# Patient Record
Sex: FEMALE | Race: WHITE | NOT HISPANIC OR LATINO | Employment: UNEMPLOYED | URBAN - NONMETROPOLITAN AREA
[De-identification: names, ages, dates, MRNs, and addresses within clinical notes are randomized per-mention and may not be internally consistent; named-entity substitution may affect disease eponyms.]

---

## 2019-07-10 PROBLEM — K59.00 CONSTIPATION: Status: ACTIVE | Noted: 2019-07-10

## 2019-07-10 PROBLEM — R10.13 EPIGASTRIC PAIN: Status: ACTIVE | Noted: 2019-07-10

## 2020-07-03 ENCOUNTER — HISTORICAL (OUTPATIENT)
Dept: ADMINISTRATIVE | Facility: HOSPITAL | Age: 35
End: 2020-07-03

## 2020-07-03 LAB
ALBUMIN SERPL BCP-MCNC: 3.1 G/DL (ref 3.5–5)
ALBUMIN/GLOB SERPL ELPH: 0.8 {RATIO} (ref 1.5–2.2)
ALP SERPL-CCNC: 70 U/L (ref 45–117)
ALT SERPL W P-5'-P-CCNC: 39 U/L (ref 13–56)
ANION GAP SERPL CALC-SCNC: 7.6 MEQ/L (ref 10–20)
APPEARANCE, UA: NORMAL
AST SERPL-CCNC: 35 U/L (ref 15–37)
B-HCG UR QL: NEGATIVE
BACTERIA SPEC CULT: NORMAL /HPF
BASOPHILS NFR BLD: 0.1 10 (ref 0–0.1)
BASOPHILS NFR BLD: 0.3 % (ref 0–1.5)
BILIRUB SERPL-MCNC: 0.78 MG/DL (ref 0.2–1)
BILIRUB UR QL STRIP: NORMAL MG/DL
BUDDING YEAST: NORMAL /HPF
BUN SERPL-MCNC: 14 MG/DL (ref 7–18)
CALCIUM SERPL-MCNC: 8.7 MG/DL (ref 8.5–10.1)
CASTS, URINE MICROSCOPIC: NEGATIVE /LPF
CHLORIDE SERPL-SCNC: 105 MMOL/L (ref 98–107)
CO2 SERPL-SCNC: 28 MMOL/L (ref 22–32)
COLOR UR: NORMAL
CREAT SERPL-MCNC: 0.75 MG/DL (ref 0.55–1.02)
EGFR: 95 ML/MIN/1.73M
EOSINOPHIL NFR BLD: 0.1 10 (ref 0–0.7)
EOSINOPHIL NFR BLD: 0.4 % (ref 0–7)
EPITHELIAL, URINE MICROSCOPIC: 50 /HPF
ERYTHROCYTE [DISTWIDTH] IN BLOOD BY AUTOMATED COUNT: 12.8 % (ref 11.5–14.5)
GLOBULIN: 3.9 G/DL (ref 2.3–3.5)
GLUCOSE (UA): NEGATIVE MG/DL
GLUCOSE SERPL-MCNC: 97 MG/DL (ref 70–99)
GRAN #: 11.56 10 (ref 2–7.5)
GRAN%: 0.5 %
GRAN%: 75.4 % (ref 50–80)
HCT VFR BLD AUTO: 47.6 % (ref 37.7–47.9)
HGB BLD-MCNC: 15.9 G/DL (ref 12.2–16.2)
HGB UR QL STRIP: NORMAL ERY/UL
IMMATURE GRANULOCYTES #: 0.08 10
INTERNAL NEG CONTROL: NEGATIVE
INTERNAL POS CONTROL: POSITIVE
KETONES UR QL STRIP: 15 MG/DL
LEUKOCYTE ESTERASE UR QL STRIP: NORMAL LEU/UL
LIPASE SERPL-CCNC: 64 U/L (ref 73–393)
LYMPH #: 2.9 10 (ref 1–3.5)
LYMPH%: 18.7 % (ref 12–50)
MCH RBC QN AUTO: 30.1 PG (ref 27–31)
MCHC RBC AUTO-ENTMCNC: 33.4 G% (ref 32–35)
MCV RBC AUTO: 90 FL (ref 80–97)
MONO #: 0.7 10 (ref 0–0.8)
MONO%: 4.7 % (ref 0–12)
NITRITE UR QL STRIP: NEGATIVE MG/DL
OSMOC: 274 MOSM/KG (ref 275–295)
PH UR STRIP: 6 [PH] (ref 5–7.5)
PMV BLD AUTO: 427 10 (ref 142–424)
PMV BLD AUTO: 8.9 FL (ref 7.4–10.4)
POTASSIUM SERPL-SCNC: 3.6 MMOL/L (ref 3.5–5.1)
PROT SERPL-MCNC: 7 G/DL (ref 6.4–8.2)
PROT UR QL STRIP: NORMAL MG/DL
RBC # BLD AUTO: 5.29 M/UL (ref 4.04–5.48)
RBC #/AREA URNS HPF: 12 /HPF
SODIUM BLD-SCNC: 137 MMOL/L (ref 136–145)
SP GR UR STRIP: 1.03 (ref 1–1.03)
SPERM, URINE MICROSCOPIC: NORMAL /HPF
TYPE OF SPECIMEN  (UA): NORMAL
UNCLASSIFIED CRYSTALS, UA: NORMAL /HPF
UROBILINOGEN UR STRIP-ACNC: 1 EU/L
WBC # BLD AUTO: 15.3 10 (ref 4–10.2)
WBC #/AREA URNS HPF: 65 /HPF

## 2020-09-04 ENCOUNTER — HOSPITAL ENCOUNTER (EMERGENCY)
Facility: HOSPITAL | Age: 35
Discharge: HOME | End: 2020-09-04
Attending: EMERGENCY MEDICINE
Payer: MEDICAID

## 2020-09-04 VITALS
OXYGEN SATURATION: 100 % | DIASTOLIC BLOOD PRESSURE: 80 MMHG | WEIGHT: 160 LBS | RESPIRATION RATE: 18 BRPM | HEIGHT: 67 IN | HEART RATE: 88 BPM | TEMPERATURE: 98 F | BODY MASS INDEX: 25.11 KG/M2 | SYSTOLIC BLOOD PRESSURE: 136 MMHG

## 2020-09-04 DIAGNOSIS — K58.9 IRRITABLE BOWEL SYNDROME, UNSPECIFIED TYPE: Primary | ICD-10-CM

## 2020-09-04 PROCEDURE — 96361 HYDRATE IV INFUSION ADD-ON: CPT

## 2020-09-04 PROCEDURE — 96374 THER/PROPH/DIAG INJ IV PUSH: CPT

## 2020-09-04 PROCEDURE — 99284 EMERGENCY DEPT VISIT MOD MDM: CPT | Mod: 25

## 2020-09-04 PROCEDURE — 63600175 PHARM REV CODE 636 W HCPCS: Performed by: CLINICAL NURSE SPECIALIST

## 2020-09-04 PROCEDURE — 25000003 PHARM REV CODE 250: Performed by: CLINICAL NURSE SPECIALIST

## 2020-09-04 RX ORDER — LISINOPRIL 40 MG/1
40 TABLET ORAL DAILY
COMMUNITY
End: 2022-08-29 | Stop reason: SDUPTHER

## 2020-09-04 RX ORDER — LEVOTHYROXINE SODIUM 75 UG/1
75 TABLET ORAL
COMMUNITY

## 2020-09-04 RX ORDER — PANTOPRAZOLE SODIUM 40 MG/1
40 FOR SUSPENSION ORAL DAILY
COMMUNITY

## 2020-09-04 RX ORDER — DICYCLOMINE HYDROCHLORIDE 10 MG/1
CAPSULE ORAL
Qty: 20 CAPSULE | Refills: 0 | Status: SHIPPED | OUTPATIENT
Start: 2020-09-04 | End: 2022-09-13

## 2020-09-04 RX ORDER — ONDANSETRON 2 MG/ML
4 INJECTION INTRAMUSCULAR; INTRAVENOUS
Status: COMPLETED | OUTPATIENT
Start: 2020-09-04 | End: 2020-09-04

## 2020-09-04 RX ADMIN — ONDANSETRON 4 MG: 2 INJECTION INTRAMUSCULAR; INTRAVENOUS at 12:09

## 2020-09-04 RX ADMIN — SODIUM CHLORIDE 1000 ML: 0.9 INJECTION, SOLUTION INTRAVENOUS at 12:09

## 2020-09-04 RX ADMIN — LIDOCAINE HYDROCHLORIDE: 20 SOLUTION ORAL; TOPICAL at 12:09

## 2020-09-04 NOTE — ED PROVIDER NOTES
Encounter Date: 9/4/2020       History     Chief Complaint   Patient presents with    Abdominal Pain     hx of IBS and ate speg last night which is now causing severe pain and vomiting     34-YEAR-OLD FEMALE PRESENTS ER WITH ABDOMINAL CRAMPING WITH NAUSEA AND VOMITING SINCE LAST NIGHT.  PATIENT HAS A HISTORY OF IBS AND ATE SPAGHETTI FOR SUPPER.  PATIENT IS OUT OF HER BENTYL  CURRENTLY        Review of patient's allergies indicates:   Allergen Reactions    Pcn [penicillins] Hives     Past Medical History:   Diagnosis Date    Chronic constipation     GERD (gastroesophageal reflux disease)     Hypertension     IBS (irritable bowel syndrome)     Thyroid disease      Past Surgical History:   Procedure Laterality Date    BTL       History reviewed. No pertinent family history.  Social History     Tobacco Use    Smoking status: Current Every Day Smoker     Packs/day: 1.00     Types: Cigarettes    Smokeless tobacco: Never Used   Substance Use Topics    Alcohol use: Never     Frequency: Never    Drug use: Not Currently     Comment: Hx opiates-quit 2015     Review of Systems   Constitutional: Positive for appetite change. Negative for fever.   HENT: Negative for sore throat.    Respiratory: Negative for shortness of breath.    Cardiovascular: Negative for chest pain.   Gastrointestinal: Positive for abdominal pain, nausea and vomiting.   Genitourinary: Negative for dysuria.   Musculoskeletal: Negative for back pain.   Skin: Negative for rash.   Neurological: Negative for weakness.   Hematological: Does not bruise/bleed easily.       Physical Exam     Initial Vitals [09/04/20 1129]   BP Pulse Resp Temp SpO2   (!) 158/79 110 20 97.9 °F (36.6 °C) 100 %      MAP       --         Physical Exam    Nursing note and vitals reviewed.  Constitutional: She appears well-developed and well-nourished.   HENT:   Head: Normocephalic and atraumatic.   Eyes: Pupils are equal, round, and reactive to light.   Neck: Normal range of  motion.   Cardiovascular: Normal rate and regular rhythm.   Pulmonary/Chest: Breath sounds normal.   Abdominal: Soft. Bowel sounds are normal. There is abdominal tenderness.   Musculoskeletal: Normal range of motion.   Neurological: She is alert and oriented to person, place, and time.   Skin: Skin is warm and dry.   Psychiatric: She has a normal mood and affect.         ED Course   Procedures  Labs Reviewed - No data to display       Imaging Results    None          Medical Decision Making:   Differential Diagnosis:   GERD, IBS                                 Clinical Impression:       ICD-10-CM ICD-9-CM   1. Irritable bowel syndrome, unspecified type  K58.9 564.1             ED Disposition Condition    Discharge Stable        ED Prescriptions     Medication Sig Dispense Start Date End Date Auth. Provider    dicyclomine (BENTYL) 10 MG capsule TAKE TWO CAPSULES BY MOUTH EVERY 6 HOURS AS NEEDED FOR abdominal cramps THANK YOU 20 capsule 9/4/2020  Julia Vanegas NP        Follow-up Information    None                                    Julia Vanegas NP  09/04/20 3883

## 2020-12-20 ENCOUNTER — HOSPITAL ENCOUNTER (INPATIENT)
Facility: HOSPITAL | Age: 35
LOS: 5 days | Discharge: HOME | DRG: 373 | End: 2020-12-25
Attending: EMERGENCY MEDICINE | Admitting: INTERNAL MEDICINE
Payer: MEDICAID

## 2020-12-20 DIAGNOSIS — E86.9 VOLUME DEPLETION: ICD-10-CM

## 2020-12-20 DIAGNOSIS — R11.2 NAUSEA AND VOMITING, INTRACTABILITY OF VOMITING NOT SPECIFIED, UNSPECIFIED VOMITING TYPE: Primary | ICD-10-CM

## 2020-12-20 DIAGNOSIS — F15.10 AMPHETAMINE ABUSE: ICD-10-CM

## 2020-12-20 DIAGNOSIS — K52.9 ENTERITIS: ICD-10-CM

## 2020-12-20 LAB
ALBUMIN SERPL BCP-MCNC: 3.7 G/DL (ref 3.5–5.2)
ALP SERPL-CCNC: 72 U/L (ref 55–135)
ALT SERPL W/O P-5'-P-CCNC: 38 U/L (ref 10–44)
AMPHET+METHAMPHET UR QL: NORMAL
ANION GAP SERPL CALC-SCNC: 5 MMOL/L (ref 8–16)
AST SERPL-CCNC: 30 U/L (ref 10–40)
BACTERIA #/AREA URNS HPF: ABNORMAL /HPF
BARBITURATES UR QL SCN>200 NG/ML: NEGATIVE
BASOPHILS # BLD AUTO: 0.05 K/UL (ref 0–0.2)
BASOPHILS NFR BLD: 0.3 % (ref 0–1.9)
BENZODIAZ UR QL SCN>200 NG/ML: NEGATIVE
BILIRUB SERPL-MCNC: 0.9 MG/DL (ref 0.1–1)
BILIRUB UR QL STRIP: NEGATIVE
BUN SERPL-MCNC: 22 MG/DL (ref 6–20)
BZE UR QL SCN: NEGATIVE
CALCIUM SERPL-MCNC: 9 MG/DL (ref 8.7–10.5)
CANNABINOIDS UR QL SCN: NORMAL
CHLORIDE SERPL-SCNC: 104 MMOL/L (ref 95–110)
CLARITY UR: ABNORMAL
CO2 SERPL-SCNC: 31 MMOL/L (ref 23–29)
COLOR UR: YELLOW
CREAT SERPL-MCNC: 1 MG/DL (ref 0.5–1.4)
CREAT UR-MCNC: 325 MG/DL (ref 15–325)
DIFFERENTIAL METHOD: ABNORMAL
EOSINOPHIL # BLD AUTO: 0.1 K/UL (ref 0–0.5)
EOSINOPHIL NFR BLD: 0.3 % (ref 0–8)
ERYTHROCYTE [DISTWIDTH] IN BLOOD BY AUTOMATED COUNT: 12.1 % (ref 11.5–14.5)
EST. GFR  (AFRICAN AMERICAN): >60 ML/MIN/1.73 M^2
EST. GFR  (NON AFRICAN AMERICAN): >60 ML/MIN/1.73 M^2
ETHANOL SERPL-MCNC: <3 MG/DL
GLUCOSE SERPL-MCNC: 106 MG/DL (ref 70–110)
GLUCOSE UR QL STRIP: NEGATIVE
HCT VFR BLD AUTO: 42.9 % (ref 37–48.5)
HGB BLD-MCNC: 14.2 G/DL (ref 12–16)
HGB UR QL STRIP: NEGATIVE
HYALINE CASTS #/AREA URNS LPF: 0 /LPF
IMM GRANULOCYTES # BLD AUTO: 0.08 K/UL (ref 0–0.04)
IMM GRANULOCYTES NFR BLD AUTO: 0.5 % (ref 0–0.5)
KETONES UR QL STRIP: >=160
LEUKOCYTE ESTERASE UR QL STRIP: ABNORMAL
LIPASE SERPL-CCNC: 71 U/L (ref 23–300)
LYMPHOCYTES # BLD AUTO: 2.9 K/UL (ref 1–4.8)
LYMPHOCYTES NFR BLD: 16.9 % (ref 18–48)
MCH RBC QN AUTO: 28.7 PG (ref 27–31)
MCHC RBC AUTO-ENTMCNC: 33.1 G/DL (ref 32–36)
MCV RBC AUTO: 87 FL (ref 82–98)
METHADONE UR QL SCN>300 NG/ML: NEGATIVE
MICROSCOPIC COMMENT: ABNORMAL
MONOCYTES # BLD AUTO: 0.8 K/UL (ref 0.3–1)
MONOCYTES NFR BLD: 4.7 % (ref 4–15)
NEUTROPHILS # BLD AUTO: 13.2 K/UL (ref 1.8–7.7)
NEUTROPHILS NFR BLD: 77.3 % (ref 38–73)
NITRITE UR QL STRIP: NEGATIVE
NRBC BLD-RTO: 0 /100 WBC
OPIATES UR QL SCN: NEGATIVE
PCP UR QL SCN>25 NG/ML: NEGATIVE
PH UR STRIP: 8 [PH] (ref 5–8)
PLATELET # BLD AUTO: 435 K/UL (ref 150–350)
PMV BLD AUTO: 9.4 FL (ref 9.2–12.9)
POTASSIUM SERPL-SCNC: 3.4 MMOL/L (ref 3.5–5.1)
PROT SERPL-MCNC: 8.1 G/DL (ref 6–8.4)
PROT UR QL STRIP: ABNORMAL
RBC # BLD AUTO: 4.95 M/UL (ref 4–5.4)
RBC #/AREA URNS HPF: 11 /HPF (ref 0–4)
SODIUM SERPL-SCNC: 140 MMOL/L (ref 136–145)
SP GR UR STRIP: >=1.03 (ref 1–1.03)
SQUAMOUS #/AREA URNS HPF: 4 /HPF
TOXICOLOGY INFORMATION: NORMAL
URN SPEC COLLECT METH UR: ABNORMAL
UROBILINOGEN UR STRIP-ACNC: 1 EU/DL
WBC # BLD AUTO: 17.09 K/UL (ref 3.9–12.7)
WBC #/AREA URNS HPF: 62 /HPF (ref 0–5)

## 2020-12-20 PROCEDURE — 36415 COLL VENOUS BLD VENIPUNCTURE: CPT

## 2020-12-20 PROCEDURE — 81025 URINE PREGNANCY TEST: CPT

## 2020-12-20 PROCEDURE — 81000 URINALYSIS NONAUTO W/SCOPE: CPT | Mod: 59

## 2020-12-20 PROCEDURE — 80307 DRUG TEST PRSMV CHEM ANLYZR: CPT

## 2020-12-20 PROCEDURE — 63600175 PHARM REV CODE 636 W HCPCS: Performed by: EMERGENCY MEDICINE

## 2020-12-20 PROCEDURE — 83690 ASSAY OF LIPASE: CPT

## 2020-12-20 PROCEDURE — 85025 COMPLETE CBC W/AUTO DIFF WBC: CPT

## 2020-12-20 PROCEDURE — 96375 TX/PRO/DX INJ NEW DRUG ADDON: CPT

## 2020-12-20 PROCEDURE — 11000001 HC ACUTE MED/SURG PRIVATE ROOM

## 2020-12-20 PROCEDURE — 99285 EMERGENCY DEPT VISIT HI MDM: CPT | Mod: 25

## 2020-12-20 PROCEDURE — 80320 DRUG SCREEN QUANTALCOHOLS: CPT

## 2020-12-20 PROCEDURE — 80053 COMPREHEN METABOLIC PANEL: CPT

## 2020-12-20 PROCEDURE — 96365 THER/PROPH/DIAG IV INF INIT: CPT

## 2020-12-20 PROCEDURE — 87086 URINE CULTURE/COLONY COUNT: CPT

## 2020-12-20 PROCEDURE — 25000003 PHARM REV CODE 250: Performed by: EMERGENCY MEDICINE

## 2020-12-20 RX ORDER — HYDROMORPHONE HYDROCHLORIDE 1 MG/ML
1 INJECTION, SOLUTION INTRAMUSCULAR; INTRAVENOUS; SUBCUTANEOUS
Status: COMPLETED | OUTPATIENT
Start: 2020-12-20 | End: 2020-12-20

## 2020-12-20 RX ADMIN — SODIUM CHLORIDE 1000 ML: 0.9 INJECTION, SOLUTION INTRAVENOUS at 11:12

## 2020-12-20 RX ADMIN — SODIUM CHLORIDE 200 ML: 0.9 INJECTION, SOLUTION INTRAVENOUS at 10:12

## 2020-12-20 RX ADMIN — HYDROMORPHONE HYDROCHLORIDE 1 MG: 1 INJECTION, SOLUTION INTRAMUSCULAR; INTRAVENOUS; SUBCUTANEOUS at 10:12

## 2020-12-20 RX ADMIN — SODIUM CHLORIDE 1000 ML: 0.9 INJECTION, SOLUTION INTRAVENOUS at 10:12

## 2020-12-20 RX ADMIN — PROMETHAZINE HYDROCHLORIDE 12.5 MG: 25 INJECTION INTRAMUSCULAR; INTRAVENOUS at 10:12

## 2020-12-21 PROBLEM — K52.9 ENTERITIS: Status: ACTIVE | Noted: 2020-12-21

## 2020-12-21 PROBLEM — F19.10 POLYSUBSTANCE ABUSE: Status: ACTIVE | Noted: 2020-12-21

## 2020-12-21 PROBLEM — R11.2 NAUSEA AND VOMITING: Status: ACTIVE | Noted: 2020-12-21

## 2020-12-21 LAB
ALBUMIN SERPL BCP-MCNC: 2.8 G/DL (ref 3.5–5.2)
ALP SERPL-CCNC: 53 U/L (ref 55–135)
ALT SERPL W/O P-5'-P-CCNC: 28 U/L (ref 10–44)
ANION GAP SERPL CALC-SCNC: 3 MMOL/L (ref 8–16)
AST SERPL-CCNC: 19 U/L (ref 10–40)
B-HCG UR QL: NEGATIVE
BASOPHILS # BLD AUTO: 0.04 K/UL (ref 0–0.2)
BASOPHILS NFR BLD: 0.4 % (ref 0–1.9)
BILIRUB SERPL-MCNC: 0.6 MG/DL (ref 0.1–1)
BUN SERPL-MCNC: 18 MG/DL (ref 6–20)
CALCIUM SERPL-MCNC: 7.8 MG/DL (ref 8.7–10.5)
CHLORIDE SERPL-SCNC: 108 MMOL/L (ref 95–110)
CO2 SERPL-SCNC: 30 MMOL/L (ref 23–29)
CREAT SERPL-MCNC: 0.8 MG/DL (ref 0.5–1.4)
CTP QC/QA: YES
DIFFERENTIAL METHOD: ABNORMAL
EOSINOPHIL # BLD AUTO: 0.1 K/UL (ref 0–0.5)
EOSINOPHIL NFR BLD: 0.7 % (ref 0–8)
ERYTHROCYTE [DISTWIDTH] IN BLOOD BY AUTOMATED COUNT: 12.1 % (ref 11.5–14.5)
EST. GFR  (AFRICAN AMERICAN): >60 ML/MIN/1.73 M^2
EST. GFR  (NON AFRICAN AMERICAN): >60 ML/MIN/1.73 M^2
GLUCOSE SERPL-MCNC: 102 MG/DL (ref 70–110)
HCT VFR BLD AUTO: 35.5 % (ref 37–48.5)
HGB BLD-MCNC: 11.3 G/DL (ref 12–16)
IMM GRANULOCYTES # BLD AUTO: 0.03 K/UL (ref 0–0.04)
IMM GRANULOCYTES NFR BLD AUTO: 0.3 % (ref 0–0.5)
LYMPHOCYTES # BLD AUTO: 3.2 K/UL (ref 1–4.8)
LYMPHOCYTES NFR BLD: 31.8 % (ref 18–48)
MCH RBC QN AUTO: 28.1 PG (ref 27–31)
MCHC RBC AUTO-ENTMCNC: 31.8 G/DL (ref 32–36)
MCV RBC AUTO: 88 FL (ref 82–98)
MONOCYTES # BLD AUTO: 0.9 K/UL (ref 0.3–1)
MONOCYTES NFR BLD: 8.6 % (ref 4–15)
NEUTROPHILS # BLD AUTO: 5.9 K/UL (ref 1.8–7.7)
NEUTROPHILS NFR BLD: 58.2 % (ref 38–73)
NRBC BLD-RTO: 0 /100 WBC
PLATELET # BLD AUTO: 319 K/UL (ref 150–350)
PMV BLD AUTO: 9.6 FL (ref 9.2–12.9)
POTASSIUM SERPL-SCNC: 3.5 MMOL/L (ref 3.5–5.1)
PROT SERPL-MCNC: 6 G/DL (ref 6–8.4)
RBC # BLD AUTO: 4.02 M/UL (ref 4–5.4)
SARS-COV-2 RDRP RESP QL NAA+PROBE: NEGATIVE
SODIUM SERPL-SCNC: 141 MMOL/L (ref 136–145)
WBC # BLD AUTO: 10.16 K/UL (ref 3.9–12.7)

## 2020-12-21 PROCEDURE — 25000003 PHARM REV CODE 250: Performed by: SURGERY

## 2020-12-21 PROCEDURE — G0378 HOSPITAL OBSERVATION PER HR: HCPCS

## 2020-12-21 PROCEDURE — 87324 CLOSTRIDIUM AG IA: CPT

## 2020-12-21 PROCEDURE — 87493 C DIFF AMPLIFIED PROBE: CPT

## 2020-12-21 PROCEDURE — 25000003 PHARM REV CODE 250: Performed by: EMERGENCY MEDICINE

## 2020-12-21 PROCEDURE — 25000003 PHARM REV CODE 250: Performed by: INTERNAL MEDICINE

## 2020-12-21 PROCEDURE — 63600175 PHARM REV CODE 636 W HCPCS: Performed by: INTERNAL MEDICINE

## 2020-12-21 PROCEDURE — 36415 COLL VENOUS BLD VENIPUNCTURE: CPT

## 2020-12-21 PROCEDURE — U0002 COVID-19 LAB TEST NON-CDC: HCPCS | Performed by: EMERGENCY MEDICINE

## 2020-12-21 PROCEDURE — 63600175 PHARM REV CODE 636 W HCPCS: Performed by: SURGERY

## 2020-12-21 PROCEDURE — 85025 COMPLETE CBC W/AUTO DIFF WBC: CPT

## 2020-12-21 PROCEDURE — 11000001 HC ACUTE MED/SURG PRIVATE ROOM

## 2020-12-21 PROCEDURE — 63600175 PHARM REV CODE 636 W HCPCS: Performed by: EMERGENCY MEDICINE

## 2020-12-21 PROCEDURE — S0030 INJECTION, METRONIDAZOLE: HCPCS | Performed by: EMERGENCY MEDICINE

## 2020-12-21 PROCEDURE — S0030 INJECTION, METRONIDAZOLE: HCPCS | Performed by: INTERNAL MEDICINE

## 2020-12-21 PROCEDURE — S0030 INJECTION, METRONIDAZOLE: HCPCS | Performed by: SURGERY

## 2020-12-21 PROCEDURE — 87449 NOS EACH ORGANISM AG IA: CPT

## 2020-12-21 PROCEDURE — 80053 COMPREHEN METABOLIC PANEL: CPT

## 2020-12-21 RX ORDER — METRONIDAZOLE 500 MG/100ML
500 INJECTION, SOLUTION INTRAVENOUS
Status: DISCONTINUED | OUTPATIENT
Start: 2020-12-21 | End: 2020-12-25 | Stop reason: HOSPADM

## 2020-12-21 RX ORDER — SODIUM, POTASSIUM,MAG SULFATES 17.5-3.13G
1 SOLUTION, RECONSTITUTED, ORAL ORAL ONCE
Status: COMPLETED | OUTPATIENT
Start: 2020-12-21 | End: 2020-12-21

## 2020-12-21 RX ORDER — SODIUM CHLORIDE 9 MG/ML
INJECTION, SOLUTION INTRAVENOUS CONTINUOUS
Status: DISCONTINUED | OUTPATIENT
Start: 2020-12-21 | End: 2020-12-25 | Stop reason: HOSPADM

## 2020-12-21 RX ORDER — HYDROMORPHONE HYDROCHLORIDE 1 MG/ML
1 INJECTION, SOLUTION INTRAMUSCULAR; INTRAVENOUS; SUBCUTANEOUS EVERY 4 HOURS PRN
Status: DISCONTINUED | OUTPATIENT
Start: 2020-12-21 | End: 2020-12-23

## 2020-12-21 RX ORDER — METHYLPREDNISOLONE SOD SUCC 125 MG
125 VIAL (EA) INJECTION EVERY 6 HOURS
Status: DISCONTINUED | OUTPATIENT
Start: 2020-12-21 | End: 2020-12-25 | Stop reason: HOSPADM

## 2020-12-21 RX ORDER — HYDROMORPHONE HYDROCHLORIDE 1 MG/ML
1 INJECTION, SOLUTION INTRAMUSCULAR; INTRAVENOUS; SUBCUTANEOUS EVERY 6 HOURS PRN
Status: DISCONTINUED | OUTPATIENT
Start: 2020-12-21 | End: 2020-12-21

## 2020-12-21 RX ORDER — TALC
6 POWDER (GRAM) TOPICAL NIGHTLY PRN
Status: DISCONTINUED | OUTPATIENT
Start: 2020-12-21 | End: 2020-12-25 | Stop reason: HOSPADM

## 2020-12-21 RX ORDER — FAMOTIDINE 10 MG/ML
20 INJECTION INTRAVENOUS EVERY 12 HOURS
Status: DISCONTINUED | OUTPATIENT
Start: 2020-12-21 | End: 2020-12-25 | Stop reason: HOSPADM

## 2020-12-21 RX ORDER — SODIUM CHLORIDE 0.9 % (FLUSH) 0.9 %
10 SYRINGE (ML) INJECTION
Status: DISCONTINUED | OUTPATIENT
Start: 2020-12-21 | End: 2020-12-25 | Stop reason: HOSPADM

## 2020-12-21 RX ORDER — CIPROFLOXACIN 2 MG/ML
400 INJECTION, SOLUTION INTRAVENOUS
Status: DISCONTINUED | OUTPATIENT
Start: 2020-12-21 | End: 2020-12-25 | Stop reason: HOSPADM

## 2020-12-21 RX ORDER — ONDANSETRON 2 MG/ML
4 INJECTION INTRAMUSCULAR; INTRAVENOUS EVERY 8 HOURS PRN
Status: DISCONTINUED | OUTPATIENT
Start: 2020-12-21 | End: 2020-12-25 | Stop reason: HOSPADM

## 2020-12-21 RX ADMIN — CIPROFLOXACIN 400 MG: 2 INJECTION, SOLUTION INTRAVENOUS at 12:12

## 2020-12-21 RX ADMIN — METRONIDAZOLE 500 MG: 500 INJECTION, SOLUTION INTRAVENOUS at 04:12

## 2020-12-21 RX ADMIN — HYDROMORPHONE HYDROCHLORIDE 1 MG: 1 INJECTION, SOLUTION INTRAMUSCULAR; INTRAVENOUS; SUBCUTANEOUS at 08:12

## 2020-12-21 RX ADMIN — HYDROMORPHONE HYDROCHLORIDE 1 MG: 1 INJECTION, SOLUTION INTRAMUSCULAR; INTRAVENOUS; SUBCUTANEOUS at 02:12

## 2020-12-21 RX ADMIN — SODIUM SULFATE, POTASSIUM SULFATE, MAGNESIUM SULFATE 354 ML: 17.5; 3.13; 1.6 SOLUTION, CONCENTRATE ORAL at 04:12

## 2020-12-21 RX ADMIN — FAMOTIDINE 20 MG: 10 INJECTION INTRAVENOUS at 08:12

## 2020-12-21 RX ADMIN — SODIUM CHLORIDE 125 ML/HR: 0.9 INJECTION, SOLUTION INTRAVENOUS at 01:12

## 2020-12-21 RX ADMIN — SODIUM CHLORIDE 125 ML/HR: 0.9 INJECTION, SOLUTION INTRAVENOUS at 07:12

## 2020-12-21 RX ADMIN — METHYLPREDNISOLONE SODIUM SUCCINATE 125 MG: 125 INJECTION, POWDER, FOR SOLUTION INTRAMUSCULAR; INTRAVENOUS at 05:12

## 2020-12-21 RX ADMIN — METRONIDAZOLE 500 MG: 500 INJECTION, SOLUTION INTRAVENOUS at 08:12

## 2020-12-21 RX ADMIN — SODIUM CHLORIDE: 0.9 INJECTION, SOLUTION INTRAVENOUS at 07:12

## 2020-12-21 RX ADMIN — CIPROFLOXACIN 400 MG: 2 INJECTION, SOLUTION INTRAVENOUS at 01:12

## 2020-12-21 RX ADMIN — METHYLPREDNISOLONE SODIUM SUCCINATE 125 MG: 125 INJECTION, POWDER, FOR SOLUTION INTRAMUSCULAR; INTRAVENOUS at 12:12

## 2020-12-21 RX ADMIN — METRONIDAZOLE 500 MG: 500 INJECTION, SOLUTION INTRAVENOUS at 01:12

## 2020-12-21 NOTE — HPI
Chief Complaint   Patient presents with    Abdominal Pain       Diffuse abdominal pain x 3 days.     Nausea    Emesis    Diarrhea     She has an underlying history of irritable bowel syndrome, was found at one point to have mild bowel wall thickening on CT scan, possibly reflective of an enteritis, and has been considered possibly to have Crohn's disease.  She has not had a colonoscopy.  She has had chronic constipation and previous opiate abuse.  Smokes cigarettes.  Underlying history of thyroid disease, hypertension, gastroesophageal reflux.  Reports she has been followed by Gastroenterology in home a, is moving to [place] and transferring care there.  Continues to take medications as listed, is not on any specific medications for inflammatory bowel disease.  Does have any history of bleeding.  Here for exacerbation of gastrointestinal symptoms as she has had before, present for 2 or 3 days, diffuse abdominal pain with nausea and vomiting, no diarrhea or constipation.  No fever, urinary symptoms, back pain, chest pain, dyspnea, or other localizing complaints.  States she has had no pain medication of any kind recently.     The history is provided by the patient. No  was used.

## 2020-12-21 NOTE — SUBJECTIVE & OBJECTIVE
Past Medical History:   Diagnosis Date    Chronic constipation     Crohn's colitis     GERD (gastroesophageal reflux disease)     Hypertension     IBS (irritable bowel syndrome)     Thyroid disease        Past Surgical History:   Procedure Laterality Date    BTL         Review of patient's allergies indicates:   Allergen Reactions    Pcn [penicillins] Hives       No current facility-administered medications on file prior to encounter.      Current Outpatient Medications on File Prior to Encounter   Medication Sig    dicyclomine (BENTYL) 10 MG capsule TAKE TWO CAPSULES BY MOUTH EVERY 6 HOURS AS NEEDED FOR abdominal cramps THANK YOU    levothyroxine (SYNTHROID) 75 MCG tablet Take 75 mcg by mouth before breakfast.    lisinopriL (PRINIVIL,ZESTRIL) 40 MG tablet Take 40 mg by mouth once daily.    pantoprazole (PROTONIX) 40 mg suspension Take 40 mg by mouth once daily.    CAMRESE 0.15 mg-30 mcg (84)/10 mcg (7) 3MPk TAKE ONE TABLET BY MOUTH ONCE A DAY. THANK YOU    methocarbamol (ROBAXIN) 500 MG Tab TAKE TWO TABLETS BY MOUTH THREE TIMES DAILY AS NEEDED FOR MUSCLE SPASMS. THANK YOU    omeprazole (PRILOSEC) 40 MG capsule TAKE ONE CAPSULE BY MOUTH ONCE A DAY FOR stomach THANK YOU    triamcinolone acetonide 0.1% (KENALOG) 0.1 % cream Apply topical TWICE A DAY THANK YOU     Family History     None        Tobacco Use    Smoking status: Current Every Day Smoker     Packs/day: 1.00     Types: Cigarettes    Smokeless tobacco: Never Used   Substance and Sexual Activity    Alcohol use: Never     Frequency: Never    Drug use: Not Currently     Comment: Hx opiates-quit 2015    Sexual activity: Not on file     Review of Systems   Constitutional: Positive for fatigue. Negative for chills and fever.   HENT: Negative for rhinorrhea, sneezing and sore throat.    Eyes: Negative for pain and visual disturbance.   Respiratory: Negative for cough and shortness of breath.    Cardiovascular: Negative for chest pain.    Gastrointestinal: Positive for abdominal distention, abdominal pain, blood in stool, diarrhea, nausea and vomiting. Negative for constipation.   Endocrine: Negative for cold intolerance and heat intolerance.   Genitourinary: Negative for difficulty urinating.   Musculoskeletal: Negative for arthralgias and joint swelling.   Skin: Negative for rash.   Allergic/Immunologic: Negative for environmental allergies.   Neurological: Negative for dizziness, syncope and weakness.   Hematological: Does not bruise/bleed easily.   Psychiatric/Behavioral: Positive for sleep disturbance. Negative for dysphoric mood. The patient is not nervous/anxious.      Objective:     Vital Signs (Most Recent):  Temp: 98.5 °F (36.9 °C) (12/21/20 0800)  Pulse: (!) 18 (12/21/20 0800)  Resp: 18 (12/21/20 0828)  BP: 129/87 (12/21/20 0800)  SpO2: (!) 66 % (12/21/20 0800) Vital Signs (24h Range):  Temp:  [97.8 °F (36.6 °C)-98.5 °F (36.9 °C)] 98.5 °F (36.9 °C)  Pulse:  [18-98] 18  Resp:  [12-20] 18  SpO2:  [66 %-100 %] 66 %  BP: (118-169)/(67-93) 129/87     Weight: 74.2 kg (163 lb 8 oz)  Body mass index is 25.61 kg/m².    Physical Exam  Vitals signs and nursing note reviewed.   Constitutional:       Appearance: Normal appearance. She is ill-appearing.   HENT:      Head: Normocephalic and atraumatic.      Nose: Nose normal.   Eyes:      Extraocular Movements: Extraocular movements intact.      Pupils: Pupils are equal, round, and reactive to light.   Neck:      Musculoskeletal: Normal range of motion and neck supple.   Cardiovascular:      Rate and Rhythm: Normal rate and regular rhythm.      Heart sounds: No murmur. No friction rub. No gallop.    Pulmonary:      Effort: Pulmonary effort is normal.      Breath sounds: Normal breath sounds.   Abdominal:      General: Abdomen is flat. Bowel sounds are normal.      Palpations: Abdomen is soft.      Tenderness: There is abdominal tenderness. There is guarding. There is no rebound.   Musculoskeletal:          General: No swelling or deformity.   Skin:     General: Skin is warm and dry.      Capillary Refill: Capillary refill takes less than 2 seconds.   Neurological:      General: No focal deficit present.      Mental Status: She is alert and oriented to person, place, and time.   Psychiatric:         Mood and Affect: Mood normal.         Behavior: Behavior normal.           CRANIAL NERVES     CN III, IV, VI   Pupils are equal, round, and reactive to light.       Significant Labs: All pertinent labs within the past 24 hours have been reviewed.    Significant Imaging: I have reviewed and interpreted all pertinent imaging results/findings within the past 24 hours.

## 2020-12-21 NOTE — H&P
[facility] Medicine  History & Physical    Patient Name: Sri Carrington  MRN: [id number]  Patient Class: OP- Observation  Admission Date: 12/20/2020  Attending Physician: Foreign Klein Jr., MD   Primary Care Provider: [facility]         Patient information was obtained from ER records.     Subjective:     Principal Problem:<principal problem not specified>    Chief Complaint:   Chief Complaint   Patient presents with    Abdominal Pain     Diffuse abdominal pain x 3 days.     Nausea    Emesis    Diarrhea        HPI:   Chief Complaint   Patient presents with    Abdominal Pain       Diffuse abdominal pain x 3 days.     Nausea    Emesis    Diarrhea     She has an underlying history of irritable bowel syndrome, was found at one point to have mild bowel wall thickening on CT scan, possibly reflective of an enteritis, and has been considered possibly to have Crohn's disease.  She has not had a colonoscopy.  She has had chronic constipation and previous opiate abuse.  Smokes cigarettes.  Underlying history of thyroid disease, hypertension, gastroesophageal reflux.  Reports she has been followed by Gastroenterology in home a, is moving to [place] and transferring care there.  Continues to take medications as listed, is not on any specific medications for inflammatory bowel disease.  Does have any history of bleeding.  Here for exacerbation of gastrointestinal symptoms as she has had before, present for 2 or 3 days, diffuse abdominal pain with nausea and vomiting, no diarrhea or constipation.  No fever, urinary symptoms, back pain, chest pain, dyspnea, or other localizing complaints.  States she has had no pain medication of any kind recently.     The history is provided by the patient. No  was used.          Past Medical History:   Diagnosis Date    Chronic constipation     Crohn's colitis     GERD (gastroesophageal  reflux disease)     Hypertension     IBS (irritable bowel syndrome)     Thyroid disease        Past Surgical History:   Procedure Laterality Date    BTL         Review of patient's allergies indicates:   Allergen Reactions    Pcn [penicillins] Hives       No current facility-administered medications on file prior to encounter.      Current Outpatient Medications on File Prior to Encounter   Medication Sig    dicyclomine (BENTYL) 10 MG capsule TAKE TWO CAPSULES BY MOUTH EVERY 6 HOURS AS NEEDED FOR abdominal cramps THANK YOU    levothyroxine (SYNTHROID) 75 MCG tablet Take 75 mcg by mouth before breakfast.    lisinopriL (PRINIVIL,ZESTRIL) 40 MG tablet Take 40 mg by mouth once daily.    pantoprazole (PROTONIX) 40 mg suspension Take 40 mg by mouth once daily.    CAMRESE 0.15 mg-30 mcg (84)/10 mcg (7) 3MPk TAKE ONE TABLET BY MOUTH ONCE A DAY. THANK YOU    methocarbamol (ROBAXIN) 500 MG Tab TAKE TWO TABLETS BY MOUTH THREE TIMES DAILY AS NEEDED FOR MUSCLE SPASMS. THANK YOU    omeprazole (PRILOSEC) 40 MG capsule TAKE ONE CAPSULE BY MOUTH ONCE A DAY FOR stomach THANK YOU    triamcinolone acetonide 0.1% (KENALOG) 0.1 % cream Apply topical TWICE A DAY THANK YOU     Family History     None        Tobacco Use    Smoking status: Current Every Day Smoker     Packs/day: 1.00     Types: Cigarettes    Smokeless tobacco: Never Used   Substance and Sexual Activity    Alcohol use: Never     Frequency: Never    Drug use: Not Currently     Comment: Hx opiates-quit 2015    Sexual activity: Not on file     Review of Systems   Constitutional: Positive for fatigue. Negative for chills and fever.   HENT: Negative for rhinorrhea, sneezing and sore throat.    Eyes: Negative for pain and visual disturbance.   Respiratory: Negative for cough and shortness of breath.    Cardiovascular: Negative for chest pain.   Gastrointestinal: Positive for abdominal distention, abdominal pain, blood in stool, diarrhea, nausea and vomiting.  Negative for constipation.   Endocrine: Negative for cold intolerance and heat intolerance.   Genitourinary: Negative for difficulty urinating.   Musculoskeletal: Negative for arthralgias and joint swelling.   Skin: Negative for rash.   Allergic/Immunologic: Negative for environmental allergies.   Neurological: Negative for dizziness, syncope and weakness.   Hematological: Does not bruise/bleed easily.   Psychiatric/Behavioral: Positive for sleep disturbance. Negative for dysphoric mood. The patient is not nervous/anxious.      Objective:     Vital Signs (Most Recent):  Temp: 98.5 °F (36.9 °C) (12/21/20 0800)  Pulse: (!) 18 (12/21/20 0800)  Resp: 18 (12/21/20 0828)  BP: 129/87 (12/21/20 0800)  SpO2: (!) 66 % (12/21/20 0800) Vital Signs (24h Range):  Temp:  [97.8 °F (36.6 °C)-98.5 °F (36.9 °C)] 98.5 °F (36.9 °C)  Pulse:  [18-98] 18  Resp:  [12-20] 18  SpO2:  [66 %-100 %] 66 %  BP: (118-169)/(67-93) 129/87     Weight: 74.2 kg (163 lb 8 oz)  Body mass index is 25.61 kg/m².    Physical Exam  Vitals signs and nursing note reviewed.   Constitutional:       Appearance: Normal appearance. She is ill-appearing.   HENT:      Head: Normocephalic and atraumatic.      Nose: Nose normal.   Eyes:      Extraocular Movements: Extraocular movements intact.      Pupils: Pupils are equal, round, and reactive to light.   Neck:      Musculoskeletal: Normal range of motion and neck supple.   Cardiovascular:      Rate and Rhythm: Normal rate and regular rhythm.      Heart sounds: No murmur. No friction rub. No gallop.    Pulmonary:      Effort: Pulmonary effort is normal.      Breath sounds: Normal breath sounds.   Abdominal:      General: Abdomen is flat. Bowel sounds are normal.      Palpations: Abdomen is soft.      Tenderness: There is abdominal tenderness. There is guarding. There is no rebound.   Musculoskeletal:         General: No swelling or deformity.   Skin:     General: Skin is warm and dry.      Capillary Refill: Capillary  refill takes less than 2 seconds.   Neurological:      General: No focal deficit present.      Mental Status: She is alert and oriented to person, place, and time.   Psychiatric:         Mood and Affect: Mood normal.         Behavior: Behavior normal.           CRANIAL NERVES     CN III, IV, VI   Pupils are equal, round, and reactive to light.       Significant Labs: All pertinent labs within the past 24 hours have been reviewed.    Significant Imaging: I have reviewed and interpreted all pertinent imaging results/findings within the past 24 hours.    Assessment/Plan:     Polysubstance abuse  Patient counseled on the health consequences associated with amphetamine and marijuana abuse she endorses understanding.      Enteritis  Infectious versus inflammatory versus other.    Medication includes: Ciprofloxacin and Flagyl.    We will consult general surgery for further recommendations at this time.      Nausea and vomiting  This is been intractable.  Continue antibiotics as needed.  Continue gentle IV fluids        VTE Risk Mitigation (From admission, onward)         Ordered     IP VTE LOW RISK PATIENT  Once      12/21/20 0052                   Foreign Klein Jr, MD  Department of Hospital Medicine   [facility]

## 2020-12-21 NOTE — ED PROVIDER NOTES
Encounter Date: 12/20/2020       History     Chief Complaint   Patient presents with    Abdominal Pain     Diffuse abdominal pain x 3 days.     Nausea    Emesis    Diarrhea     She has an underlying history of irritable bowel syndrome, was found at one point to have mild bowel wall thickening on CT scan, possibly reflective of an enteritis, and has been considered possibly to have Crohn's disease.  She has not had a colonoscopy.  She has had chronic constipation and previous opiate abuse.  Smokes cigarettes.  Underlying history of thyroid disease, hypertension, gastroesophageal reflux.  Reports she has been followed by Gastroenterology in home a, is moving to [place] and transferring care there.  Continues to take medications as listed, is not on any specific medications for inflammatory bowel disease.  Does have any history of bleeding.  Here for exacerbation of gastrointestinal symptoms as she has had before, present for 2 or 3 days, diffuse abdominal pain with nausea and vomiting, no diarrhea or constipation.  No fever, urinary symptoms, back pain, chest pain, dyspnea, or other localizing complaints.  States she has had no pain medication of any kind recently.    The history is provided by the patient. No  was used.     Review of patient's allergies indicates:   Allergen Reactions    Pcn [penicillins] Hives     Past Medical History:   Diagnosis Date    Chronic constipation     Crohn's colitis     GERD (gastroesophageal reflux disease)     Hypertension     IBS (irritable bowel syndrome)     Thyroid disease      Past Surgical History:   Procedure Laterality Date    BTL       History reviewed. No pertinent family history.  Social History     Tobacco Use    Smoking status: Current Every Day Smoker     Packs/day: 1.00     Types: Cigarettes    Smokeless tobacco: Never Used   Substance Use Topics    Alcohol use: Never     Frequency: Never    Drug use: Not Currently     Comment: Kari  opiates-quit 2015     Review of Systems   Constitutional: Negative for activity change, fatigue and fever.   HENT: Negative for congestion, ear pain, facial swelling, nosebleeds, sinus pressure and sore throat.    Eyes: Negative for pain, discharge, redness and visual disturbance.   Respiratory: Negative for cough, choking, chest tightness, shortness of breath and wheezing.    Cardiovascular: Negative for chest pain, palpitations and leg swelling.   Gastrointestinal: Positive for abdominal pain, nausea and vomiting. Negative for abdominal distention.   Endocrine: Negative for heat intolerance, polydipsia and polyuria.   Genitourinary: Negative for difficulty urinating, dysuria, flank pain, hematuria and urgency.   Musculoskeletal: Negative for back pain, gait problem, joint swelling and myalgias.   Skin: Negative for color change and rash.   Allergic/Immunologic: Negative for environmental allergies and food allergies.   Neurological: Negative for dizziness, weakness, numbness and headaches.   Hematological: Negative for adenopathy. Does not bruise/bleed easily.   Psychiatric/Behavioral: Negative for agitation and behavioral problems. The patient is not nervous/anxious.    All other systems reviewed and are negative.      Physical Exam     Initial Vitals [12/20/20 2215]   BP Pulse Resp Temp SpO2   (!) 169/91 98 18 97.8 °F (36.6 °C) 99 %      MAP       --         Physical Exam    Nursing note and vitals reviewed.  Constitutional: She appears well-developed and well-nourished. She is not diaphoretic. She appears distressed.   Restless, moaning, writhing around in pain, somewhat poor cooperation with exam and history   HENT:   Head: Normocephalic and atraumatic.   Mouth/Throat: No oropharyngeal exudate.   Eyes: Conjunctivae and EOM are normal. Pupils are equal, round, and reactive to light. Right eye exhibits no discharge. Left eye exhibits no discharge. No scleral icterus.   Neck: Normal range of motion. Neck supple.  No thyromegaly present. No tracheal deviation present. No JVD present.   Cardiovascular: Normal rate, regular rhythm, normal heart sounds and intact distal pulses. Exam reveals no gallop and no friction rub.    No murmur heard.  Pulmonary/Chest: Breath sounds normal. No stridor. No respiratory distress. She has no wheezes. She has no rhonchi. She has no rales. She exhibits no tenderness.   Abdominal: Soft. Bowel sounds are normal. She exhibits no distension and no mass. There is abdominal tenderness. There is no rebound and no guarding.   Diffuse abdominal tenderness, predominantly upper mid abdomen; no localizing rebound or guarding; no distension; no mass; bowel sounds normal   Musculoskeletal: Normal range of motion. No tenderness or edema.   Neurological: She is alert and oriented to person, place, and time. She has normal strength.   Skin: Skin is warm and dry. No rash and no abscess noted. No erythema.   Psychiatric: She has a normal mood and affect. Her behavior is normal. Judgment and thought content normal.         ED Course   Procedures  Labs Reviewed   CBC W/ AUTO DIFFERENTIAL - Abnormal; Notable for the following components:       Result Value    WBC 17.09 (*)     Platelets 435 (*)     Gran # (ANC) 13.2 (*)     Immature Grans (Abs) 0.08 (*)     Gran % 77.3 (*)     Lymph % 16.9 (*)     All other components within normal limits   COMPREHENSIVE METABOLIC PANEL - Abnormal; Notable for the following components:    Potassium 3.4 (*)     CO2 31 (*)     BUN 22 (*)     Anion Gap 5 (*)     All other components within normal limits   URINALYSIS, REFLEX TO URINE CULTURE - Abnormal; Notable for the following components:    Appearance, UA Cloudy (*)     Specific Gravity, UA >=1.030 (*)     Protein, UA 1+ (*)     Leukocytes, UA 3+ (*)     All other components within normal limits    Narrative:     Specimen Source->Urine   URINALYSIS MICROSCOPIC - Abnormal; Notable for the following components:    RBC, UA 11 (*)      WBC, UA 62 (*)     Bacteria Few (*)     All other components within normal limits    Narrative:     Specimen Source->Urine   CULTURE, URINE   LIPASE   DRUG SCREEN PANEL, URINE EMERGENCY    Narrative:     Specimen Source->Urine   ALCOHOL,MEDICAL (ETHANOL)   POCT URINE PREGNANCY          Imaging Results          CT Abdomen Pelvis  Without Contrast (Final result)  Result time 12/20/20 23:47:08    Final result by Emerson Mart MD (12/20/20 23:47:08)                 Impression:      1. Markedly thickened small bowel loops consistent with enteritis.  Inflammatory bowel disease is in the differential along with infectious enteritis.  The pattern is similar to the prior study.  2. Mild pelvic free fluid is nonspecific, but likely reactive  3. Hepatic steatosis  4. Sludge within the gallbladder versus artifact      Electronically signed by: Emerson Mart MD  Date:    12/20/2020  Time:    23:47             Narrative:    EXAMINATION:  CT ABDOMEN PELVIS WITHOUT CONTRAST    CLINICAL HISTORY:  Possible Crohn's disease abdominal pain, acute, nonlocalized;Reported h/o Crohn's but never had colonoscopy;    TECHNIQUE:  Axial CT images were obtained from the lung bases through the pelvis without oral or intravenous contrast.  Multiplanar reconstructions evaluated.  Iterative reconstruction technique was used.  CT/Cardiac nuclear examinations in the past 12 months: 1    COMPARISON:  CT abdomen and pelvis 07/03/2020    FINDINGS:  Imaged lung bases are clear.  Lack of intravenous and oral contrast limits evaluation of the abdominal and pelvic structures.  Marked diffuse decreased density of the liver consistent with fatty infiltration.  Unenhanced spleen, adrenals, pancreas unremarkable.  Relative increased density in the dependent gallbladder may be artifactual or may be related to gallbladder sludge.  No evidence of nephroureterolithiasis or hydroureteronephrosis.  Distention of the stomach with an air-fluid level present.  Numerous  small bowel loops predominating within the central abdomen and left hemiabdomen demonstrate significant wall thickening thickest portions of the wall measuring up to 1.2 cm in diameter.  This pattern is similar to the prior study.  No detected large bowel abnormality.  No evidence of appendicitis.  No pattern of bowel obstruction.  Few prominent likely reactive mesenteric lymph nodes.  Bilateral tubal ligation clips in place.  Mild free fluid in the dependent pelvis and in the adnexal regions.  No acute osseous change.                                11:54 PM Mild improvement.  She admits to taking a friend's Vyvanse, counseled about never doing such things.  Considering the uncertain diagnosis, degree of symptoms, dehydration, and degree of findings on CT, recommend admission for IV hydration.  Discussed with Hospital Medicine - Dr. Dye for Cefalu.                                   Clinical Impression:     ICD-10-CM ICD-9-CM   1. Nausea and vomiting, intractability of vomiting not specified, unspecified vomiting type  R11.2 787.01   2. Volume depletion  E86.9 276.50   3. Amphetamine abuse  F15.10 305.70   4. Enteritis  K52.9 558.9                      Disposition:   Disposition: Placed in Observation  Condition: Stable  [facility]/ Internal Medicine/ Med-Surg/ Dr. Dye for Cefalu       ED Disposition Condition    Observation                             Vin Zhao MD  12/21/20 0001       Vin Zhao MD  12/21/20 0002

## 2020-12-21 NOTE — ASSESSMENT & PLAN NOTE
Infectious versus inflammatory versus other.    Medication includes: Ciprofloxacin and Flagyl.    We will consult general surgery for further recommendations at this time.

## 2020-12-21 NOTE — ED NOTES
Pt presents to ED with c/o diffuse abd pain times 3 days. Reports n/v/d. Pt reports a hx of crohn's disease and says it feel similar to previous flare ups.

## 2020-12-21 NOTE — PLAN OF CARE
Pt c/o severe pain to the abd, laying on her back with legs drawn up, moaning and rubbing her stomach. Pain med as ordered. No emesis or diarrhea noted. Continue to observe.

## 2020-12-21 NOTE — PLAN OF CARE
12/21/20 1203   Discharge Assessment   Assessment Type Discharge Planning Assessment   Confirmed/corrected address and phone number on facesheet? Yes   Assessment information obtained from? Patient   Expected Length of Stay (days) 2   Communicated expected length of stay with patient/caregiver yes   Prior to hospitilization cognitive status: Alert/Oriented   Prior to hospitalization functional status: Independent   Current cognitive status: Alert/Oriented   Current Functional Status: Independent   Lives With significant other   Able to Return to Prior Arrangements yes   Is patient able to care for self after discharge? Yes   Patient's perception of discharge disposition home or selfcare   Readmission Within the Last 30 Days no previous admission in last 30 days   Patient currently being followed by outpatient case management? No   Patient currently receives any other outside agency services? No   Equipment Currently Used at Home none   Do you have any problems affording any of your prescribed medications? No   Is the patient taking medications as prescribed? yes   Does the patient have transportation home? Yes   Transportation Anticipated family or friend will provide   Does the patient receive services at the [facility]? No   Discharge Plan A Home   Discharge Plan B Home   DME Needed Upon Discharge  none   Patient/Family in Agreement with Plan yes     Patient lives w/bf in [place], [place].  Came visit family in [place] when she felt sick.  Patient has no needs.  States she is currently trying to establish care w/doctor in [place].  Plans to dc back home w/bf.

## 2020-12-21 NOTE — NURSING
Spoke with Cathy Jones, @ [facility] and informed her of the surgery consult for the patient in Room 643.

## 2020-12-21 NOTE — ASSESSMENT & PLAN NOTE
Patient counseled on the health consequences associated with amphetamine and marijuana abuse she endorses understanding.

## 2020-12-22 ENCOUNTER — ANESTHESIA (OUTPATIENT)
Dept: ENDOSCOPY | Facility: HOSPITAL | Age: 35
DRG: 373 | End: 2020-12-22
Payer: MEDICAID

## 2020-12-22 ENCOUNTER — ANESTHESIA EVENT (OUTPATIENT)
Dept: ENDOSCOPY | Facility: HOSPITAL | Age: 35
DRG: 373 | End: 2020-12-22
Payer: MEDICAID

## 2020-12-22 LAB
ALBUMIN SERPL BCP-MCNC: 3.5 G/DL (ref 3.5–5.2)
ALP SERPL-CCNC: 66 U/L (ref 55–135)
ALT SERPL W/O P-5'-P-CCNC: 44 U/L (ref 10–44)
ANION GAP SERPL CALC-SCNC: 6 MMOL/L (ref 8–16)
AST SERPL-CCNC: 33 U/L (ref 10–40)
BACTERIA UR CULT: NORMAL
BASOPHILS # BLD AUTO: 0.01 K/UL (ref 0–0.2)
BASOPHILS NFR BLD: 0.1 % (ref 0–1.9)
BILIRUB SERPL-MCNC: 0.6 MG/DL (ref 0.1–1)
BUN SERPL-MCNC: 8 MG/DL (ref 6–20)
C DIFF GDH STL QL: POSITIVE
C DIFF TOX A+B STL QL IA: NEGATIVE
C DIFF TOX GENS STL QL NAA+PROBE: POSITIVE
CALCIUM SERPL-MCNC: 8.4 MG/DL (ref 8.7–10.5)
CHLORIDE SERPL-SCNC: 107 MMOL/L (ref 95–110)
CO2 SERPL-SCNC: 28 MMOL/L (ref 23–29)
CREAT SERPL-MCNC: 0.8 MG/DL (ref 0.5–1.4)
DIFFERENTIAL METHOD: ABNORMAL
EOSINOPHIL # BLD AUTO: 0 K/UL (ref 0–0.5)
EOSINOPHIL NFR BLD: 0 % (ref 0–8)
ERYTHROCYTE [DISTWIDTH] IN BLOOD BY AUTOMATED COUNT: 11.9 % (ref 11.5–14.5)
EST. GFR  (AFRICAN AMERICAN): >60 ML/MIN/1.73 M^2
EST. GFR  (NON AFRICAN AMERICAN): >60 ML/MIN/1.73 M^2
GLUCOSE SERPL-MCNC: 109 MG/DL (ref 70–110)
HCT VFR BLD AUTO: 40.4 % (ref 37–48.5)
HGB BLD-MCNC: 13.1 G/DL (ref 12–16)
IMM GRANULOCYTES # BLD AUTO: 0.07 K/UL (ref 0–0.04)
IMM GRANULOCYTES NFR BLD AUTO: 0.6 % (ref 0–0.5)
LYMPHOCYTES # BLD AUTO: 1.4 K/UL (ref 1–4.8)
LYMPHOCYTES NFR BLD: 12.4 % (ref 18–48)
MCH RBC QN AUTO: 28.7 PG (ref 27–31)
MCHC RBC AUTO-ENTMCNC: 32.4 G/DL (ref 32–36)
MCV RBC AUTO: 89 FL (ref 82–98)
MONOCYTES # BLD AUTO: 0.1 K/UL (ref 0.3–1)
MONOCYTES NFR BLD: 1.2 % (ref 4–15)
NEUTROPHILS # BLD AUTO: 9.8 K/UL (ref 1.8–7.7)
NEUTROPHILS NFR BLD: 85.7 % (ref 38–73)
NRBC BLD-RTO: 0 /100 WBC
PLATELET # BLD AUTO: 360 K/UL (ref 150–350)
PMV BLD AUTO: 9.7 FL (ref 9.2–12.9)
POTASSIUM SERPL-SCNC: 3.6 MMOL/L (ref 3.5–5.1)
PROT SERPL-MCNC: 7.6 G/DL (ref 6–8.4)
RBC # BLD AUTO: 4.56 M/UL (ref 4–5.4)
SODIUM SERPL-SCNC: 141 MMOL/L (ref 136–145)
WBC # BLD AUTO: 11.48 K/UL (ref 3.9–12.7)

## 2020-12-22 PROCEDURE — 63600175 PHARM REV CODE 636 W HCPCS: Performed by: SURGERY

## 2020-12-22 PROCEDURE — 36415 COLL VENOUS BLD VENIPUNCTURE: CPT

## 2020-12-22 PROCEDURE — 37000008 HC ANESTHESIA 1ST 15 MINUTES: Performed by: SURGERY

## 2020-12-22 PROCEDURE — S0030 INJECTION, METRONIDAZOLE: HCPCS | Performed by: INTERNAL MEDICINE

## 2020-12-22 PROCEDURE — 25000003 PHARM REV CODE 250: Performed by: SURGERY

## 2020-12-22 PROCEDURE — 63600175 PHARM REV CODE 636 W HCPCS: Performed by: INTERNAL MEDICINE

## 2020-12-22 PROCEDURE — 85025 COMPLETE CBC W/AUTO DIFF WBC: CPT

## 2020-12-22 PROCEDURE — 45378 DIAGNOSTIC COLONOSCOPY: CPT | Performed by: SURGERY

## 2020-12-22 PROCEDURE — 43239 EGD BIOPSY SINGLE/MULTIPLE: CPT | Performed by: SURGERY

## 2020-12-22 PROCEDURE — 87507 IADNA-DNA/RNA PROBE TQ 12-25: CPT

## 2020-12-22 PROCEDURE — 27201423 OPTIME MED/SURG SUP & DEVICES STERILE SUPPLY: Performed by: SURGERY

## 2020-12-22 PROCEDURE — 25000003 PHARM REV CODE 250: Performed by: NURSE ANESTHETIST, CERTIFIED REGISTERED

## 2020-12-22 PROCEDURE — 96361 HYDRATE IV INFUSION ADD-ON: CPT | Performed by: EMERGENCY MEDICINE

## 2020-12-22 PROCEDURE — G0378 HOSPITAL OBSERVATION PER HR: HCPCS

## 2020-12-22 PROCEDURE — 11000001 HC ACUTE MED/SURG PRIVATE ROOM

## 2020-12-22 PROCEDURE — S0030 INJECTION, METRONIDAZOLE: HCPCS | Performed by: SURGERY

## 2020-12-22 PROCEDURE — 37000009 HC ANESTHESIA EA ADD 15 MINS: Performed by: SURGERY

## 2020-12-22 PROCEDURE — 25000003 PHARM REV CODE 250: Performed by: INTERNAL MEDICINE

## 2020-12-22 PROCEDURE — 80053 COMPREHEN METABOLIC PANEL: CPT

## 2020-12-22 RX ORDER — SODIUM CHLORIDE 9 MG/ML
INJECTION, SOLUTION INTRAVENOUS CONTINUOUS PRN
Status: DISCONTINUED | OUTPATIENT
Start: 2020-12-22 | End: 2020-12-22

## 2020-12-22 RX ORDER — DIPHENHYDRAMINE HYDROCHLORIDE 50 MG/ML
25 INJECTION INTRAMUSCULAR; INTRAVENOUS ONCE
Status: COMPLETED | OUTPATIENT
Start: 2020-12-22 | End: 2020-12-22

## 2020-12-22 RX ORDER — PROPOFOL 10 MG/ML
VIAL (ML) INTRAVENOUS
Status: DISCONTINUED | OUTPATIENT
Start: 2020-12-22 | End: 2020-12-22

## 2020-12-22 RX ORDER — METOCLOPRAMIDE 10 MG/1
10 TABLET ORAL
Status: DISCONTINUED | OUTPATIENT
Start: 2020-12-22 | End: 2020-12-22

## 2020-12-22 RX ORDER — SODIUM CHLORIDE 9 MG/ML
INJECTION, SOLUTION INTRAVENOUS CONTINUOUS
Status: DISCONTINUED | OUTPATIENT
Start: 2020-12-22 | End: 2020-12-23

## 2020-12-22 RX ADMIN — Medication 50 MG: at 02:12

## 2020-12-22 RX ADMIN — CIPROFLOXACIN 400 MG: 2 INJECTION, SOLUTION INTRAVENOUS at 12:12

## 2020-12-22 RX ADMIN — HYDROMORPHONE HYDROCHLORIDE 1 MG: 1 INJECTION, SOLUTION INTRAMUSCULAR; INTRAVENOUS; SUBCUTANEOUS at 01:12

## 2020-12-22 RX ADMIN — HYDROMORPHONE HYDROCHLORIDE 1 MG: 1 INJECTION, SOLUTION INTRAMUSCULAR; INTRAVENOUS; SUBCUTANEOUS at 05:12

## 2020-12-22 RX ADMIN — METHYLPREDNISOLONE SODIUM SUCCINATE 125 MG: 125 INJECTION, POWDER, FOR SOLUTION INTRAMUSCULAR; INTRAVENOUS at 12:12

## 2020-12-22 RX ADMIN — SODIUM CHLORIDE: 0.9 INJECTION, SOLUTION INTRAVENOUS at 08:12

## 2020-12-22 RX ADMIN — METHYLPREDNISOLONE SODIUM SUCCINATE 125 MG: 125 INJECTION, POWDER, FOR SOLUTION INTRAMUSCULAR; INTRAVENOUS at 05:12

## 2020-12-22 RX ADMIN — METOCLOPRAMIDE 10 MG: 10 TABLET ORAL at 05:12

## 2020-12-22 RX ADMIN — HYDROMORPHONE HYDROCHLORIDE 1 MG: 1 INJECTION, SOLUTION INTRAMUSCULAR; INTRAVENOUS; SUBCUTANEOUS at 08:12

## 2020-12-22 RX ADMIN — SODIUM CHLORIDE: 0.9 INJECTION, SOLUTION INTRAVENOUS at 02:12

## 2020-12-22 RX ADMIN — METRONIDAZOLE 500 MG: 500 INJECTION, SOLUTION INTRAVENOUS at 08:12

## 2020-12-22 RX ADMIN — FAMOTIDINE 20 MG: 10 INJECTION INTRAVENOUS at 08:12

## 2020-12-22 RX ADMIN — METRONIDAZOLE 500 MG: 500 INJECTION, SOLUTION INTRAVENOUS at 05:12

## 2020-12-22 RX ADMIN — HYDROMORPHONE HYDROCHLORIDE 1 MG: 1 INJECTION, SOLUTION INTRAMUSCULAR; INTRAVENOUS; SUBCUTANEOUS at 12:12

## 2020-12-22 RX ADMIN — TOPICAL ANESTHETIC 2 EACH: 200 SPRAY DENTAL; PERIODONTAL at 01:12

## 2020-12-22 RX ADMIN — DIPHENHYDRAMINE HYDROCHLORIDE 25 MG: 50 INJECTION INTRAMUSCULAR; INTRAVENOUS at 07:12

## 2020-12-22 RX ADMIN — METRONIDAZOLE 500 MG: 500 INJECTION, SOLUTION INTRAVENOUS at 12:12

## 2020-12-22 RX ADMIN — Medication 100 MG: at 02:12

## 2020-12-22 RX ADMIN — HYDROMORPHONE HYDROCHLORIDE 1 MG: 1 INJECTION, SOLUTION INTRAMUSCULAR; INTRAVENOUS; SUBCUTANEOUS at 09:12

## 2020-12-22 RX ADMIN — SODIUM CHLORIDE: 0.9 INJECTION, SOLUTION INTRAVENOUS at 01:12

## 2020-12-22 RX ADMIN — SODIUM CHLORIDE: 0.9 INJECTION, SOLUTION INTRAVENOUS at 05:12

## 2020-12-22 NOTE — PROGRESS NOTES
[facility]  [facility]    Patient Name: Sri Carrington  MRN: [id number]  Patient Class: OP- Observation   Admission Date: 12/20/2020  Length of Stay: 0 days  Attending Physician: Foreign Klein Jr., MD  Primary Care Provider: [facility]        Subjective:     Principal Problem:Enteritis        HPI:  Chief Complaint   Patient presents with    Abdominal Pain       Diffuse abdominal pain x 3 days.     Nausea    Emesis    Diarrhea     She has an underlying history of irritable bowel syndrome, was found at one point to have mild bowel wall thickening on CT scan, possibly reflective of an enteritis, and has been considered possibly to have Crohn's disease.  She has not had a colonoscopy.  She has had chronic constipation and previous opiate abuse.  Smokes cigarettes.  Underlying history of thyroid disease, hypertension, gastroesophageal reflux.  Reports she has been followed by Gastroenterology in home a, is moving to [place] and transferring care there.  Continues to take medications as listed, is not on any specific medications for inflammatory bowel disease.  Does have any history of bleeding.  Here for exacerbation of gastrointestinal symptoms as she has had before, present for 2 or 3 days, diffuse abdominal pain with nausea and vomiting, no diarrhea or constipation.  No fever, urinary symptoms, back pain, chest pain, dyspnea, or other localizing complaints.  States she has had no pain medication of any kind recently.     The history is provided by the patient. No  was used.          Overview/Hospital Course:  12/22/2020:  Patient continues to endorse some abdominal discomfort.  There may be some slight improvement from yesterday but overall her upper abdominal and lower abdominal pain persists.  She has been seen by general surgery.  Plan for endoscopies today.    Interval History: no acute events  overnight.    Review of Systems   Constitutional: Positive for fatigue. Negative for chills and fever.   HENT: Negative for rhinorrhea, sneezing and sore throat.    Eyes: Negative for pain and visual disturbance.   Respiratory: Negative for cough and shortness of breath.    Cardiovascular: Negative for chest pain.   Gastrointestinal: Positive for abdominal distention, abdominal pain, blood in stool, diarrhea, nausea and vomiting. Negative for constipation.   Endocrine: Negative for cold intolerance and heat intolerance.   Genitourinary: Negative for difficulty urinating.   Musculoskeletal: Negative for arthralgias and joint swelling.   Skin: Negative for rash.   Allergic/Immunologic: Negative for environmental allergies.   Neurological: Negative for dizziness, syncope and weakness.   Hematological: Does not bruise/bleed easily.   Psychiatric/Behavioral: Positive for sleep disturbance. Negative for dysphoric mood. The patient is not nervous/anxious.      Objective:     Vital Signs (Most Recent):  Temp: 98.4 °F (36.9 °C) (12/22/20 1102)  Pulse: 66 (12/22/20 1102)  Resp: 18 (12/22/20 1102)  BP: (!) 155/81 (12/22/20 1102)  SpO2: 99 % (12/22/20 1102) Vital Signs (24h Range):  Temp:  [98.2 °F (36.8 °C)-98.6 °F (37 °C)] 98.4 °F (36.9 °C)  Pulse:  [64-87] 66  Resp:  [18-20] 18  SpO2:  [95 %-100 %] 99 %  BP: (124-175)/() 155/81     Weight: 74.2 kg (163 lb 8 oz)  Body mass index is 25.61 kg/m².    Intake/Output Summary (Last 24 hours) at 12/22/2020 1202  Last data filed at 12/22/2020 0500  Gross per 24 hour   Intake 5405 ml   Output 2000 ml   Net 3405 ml      Physical Exam  Vitals signs and nursing note reviewed.   Constitutional:       Appearance: Normal appearance. She is ill-appearing.   HENT:      Head: Normocephalic and atraumatic.      Nose: Nose normal.   Eyes:      Extraocular Movements: Extraocular movements intact.      Pupils: Pupils are equal, round, and reactive to light.   Neck:      Musculoskeletal:  Normal range of motion and neck supple.   Cardiovascular:      Rate and Rhythm: Normal rate and regular rhythm.      Heart sounds: No murmur. No friction rub. No gallop.    Pulmonary:      Effort: Pulmonary effort is normal.      Breath sounds: Normal breath sounds.   Abdominal:      General: Abdomen is flat. Bowel sounds are normal.      Palpations: Abdomen is soft.      Tenderness: There is abdominal tenderness. There is guarding. There is no rebound.   Musculoskeletal:         General: No swelling or deformity.   Skin:     General: Skin is warm and dry.      Capillary Refill: Capillary refill takes less than 2 seconds.   Neurological:      General: No focal deficit present.      Mental Status: She is alert and oriented to person, place, and time.   Psychiatric:         Mood and Affect: Mood normal.         Behavior: Behavior normal.         Significant Labs: All pertinent labs within the past 24 hours have been reviewed.    Significant Imaging: I have reviewed and interpreted all pertinent imaging results/findings within the past 24 hours.      Assessment/Plan:      * Enteritis  Infectious versus inflammatory versus other.  To undergo upper and lower endoscopy by general surgery.  We appreciate their input.    Medication includes: Ciprofloxacin and Flagyl.    C. Difficile screen pending.      Polysubstance abuse  Patient counseled on the health consequences associated with amphetamine and marijuana abuse she endorses understanding.      Nausea and vomiting  This is been intractable.  Continue antibiotics as needed.  Continue gentle IV fluids        VTE Risk Mitigation (From admission, onward)         Ordered     IP VTE LOW RISK PATIENT  Once      12/21/20 0052                Discharge Planning   JACKIE:      Code Status: Full Code   Is the patient medically ready for discharge?:     Reason for patient still in hospital (select all that apply): Treatment  Discharge Plan A: Home                  Foreign Klein Jr,  MD  Department of Hospital Medicine   [facility]

## 2020-12-22 NOTE — HOSPITAL COURSE
12/22/2020:  Patient continues to endorse some abdominal discomfort.  There may be some slight improvement from yesterday but overall her upper abdominal and lower abdominal pain persists.  She has been seen by general surgery.  Plan for endoscopies today.    12/23/2020:  No acute events overnight.  The patient's C. Difficile toxin has come back positive.  We will begin oral vancomycin.  Upper and lower endoscopies performed.

## 2020-12-22 NOTE — HPI
Admitted for abdominal pain.  Reports pain is achy and sharp at the same time.  Reports it has been ongoing for at least a year.  GI treated her with Miralax for constipation.  No endoscopy in the past.  Some diarrhea and constipation.  Stool dark occasionally.  No BRBPR.  Nausea and vomiting even with liquids.

## 2020-12-22 NOTE — SUBJECTIVE & OBJECTIVE
No current facility-administered medications on file prior to encounter.      Current Outpatient Medications on File Prior to Encounter   Medication Sig    CAMRESE 0.15 mg-30 mcg (84)/10 mcg (7) 3MPk TAKE ONE TABLET BY MOUTH ONCE A DAY. THANK YOU    dicyclomine (BENTYL) 10 MG capsule TAKE TWO CAPSULES BY MOUTH EVERY 6 HOURS AS NEEDED FOR abdominal cramps THANK YOU    levothyroxine (SYNTHROID) 75 MCG tablet Take 75 mcg by mouth before breakfast.    lisinopriL (PRINIVIL,ZESTRIL) 40 MG tablet Take 40 mg by mouth once daily.    methocarbamol (ROBAXIN) 500 MG Tab TAKE TWO TABLETS BY MOUTH THREE TIMES DAILY AS NEEDED FOR MUSCLE SPASMS. THANK YOU    omeprazole (PRILOSEC) 40 MG capsule TAKE ONE CAPSULE BY MOUTH ONCE A DAY FOR stomach THANK YOU    pantoprazole (PROTONIX) 40 mg suspension Take 40 mg by mouth once daily.    triamcinolone acetonide 0.1% (KENALOG) 0.1 % cream Apply topical TWICE A DAY THANK YOU       Review of patient's allergies indicates:   Allergen Reactions    Pcn [penicillins] Hives       Past Medical History:   Diagnosis Date    Chronic constipation     Crohn's colitis     GERD (gastroesophageal reflux disease)     Hypertension     IBS (irritable bowel syndrome)     Thyroid disease      Past Surgical History:   Procedure Laterality Date    BTL       Family History     None        Tobacco Use    Smoking status: Current Every Day Smoker     Packs/day: 1.00     Types: Cigarettes    Smokeless tobacco: Never Used   Substance and Sexual Activity    Alcohol use: Never     Frequency: Never    Drug use: Not Currently     Comment: Hx opiates-quit 2015    Sexual activity: Not on file     Review of Systems   Constitutional: Positive for appetite change and fatigue. Negative for chills.   HENT: Negative for congestion, sore throat and trouble swallowing.    Respiratory: Negative for apnea, choking, shortness of breath, wheezing and stridor.    Cardiovascular: Negative for palpitations and leg  swelling.   Gastrointestinal: Positive for abdominal pain, nausea and vomiting. Negative for abdominal distention and blood in stool.   Endocrine: Negative for cold intolerance and heat intolerance.   Genitourinary: Positive for pelvic pain.   Musculoskeletal: Negative for arthralgias, back pain and myalgias.   Skin: Negative for pallor and rash.   Neurological: Positive for weakness.   Psychiatric/Behavioral: Negative for behavioral problems, dysphoric mood and sleep disturbance.   All other systems reviewed and are negative.    Objective:     Vital Signs (Most Recent):  Temp: 98.2 °F (36.8 °C) (12/21/20 1601)  Pulse: 75 (12/21/20 1601)  Resp: 20 (12/21/20 1601)  BP: (!) 155/81 (12/21/20 1601)  SpO2: 98 % (12/21/20 1601) Vital Signs (24h Range):  Temp:  [97.8 °F (36.6 °C)-99 °F (37.2 °C)] 98.2 °F (36.8 °C)  Pulse:  [18-98] 75  Resp:  [12-20] 20  SpO2:  [98 %-100 %] 98 %  BP: (118-169)/(67-93) 155/81     Weight: 74.2 kg (163 lb 8 oz)  Body mass index is 25.61 kg/m².    Physical Exam  Vitals signs and nursing note reviewed.   Constitutional:       General: She is not in acute distress.  HENT:      Head: Normocephalic and atraumatic.      Nose: Nose normal.      Mouth/Throat:      Mouth: Mucous membranes are moist.      Pharynx: Oropharynx is clear.   Eyes:      General: No scleral icterus.     Extraocular Movements: Extraocular movements intact.      Conjunctiva/sclera: Conjunctivae normal.      Pupils: Pupils are equal, round, and reactive to light.   Neck:      Musculoskeletal: Normal range of motion and neck supple.   Cardiovascular:      Rate and Rhythm: Normal rate and regular rhythm.      Pulses: Normal pulses.      Heart sounds: Normal heart sounds. No murmur. No gallop.    Pulmonary:      Effort: Pulmonary effort is normal. No respiratory distress.      Breath sounds: Normal breath sounds. No stridor. No wheezing, rhonchi or rales.   Abdominal:      General: Abdomen is flat. Bowel sounds are normal. There is  no distension.      Palpations: Abdomen is soft.      Tenderness: There is abdominal tenderness.   Musculoskeletal: Normal range of motion.         General: No swelling.   Skin:     General: Skin is warm and dry.   Neurological:      General: No focal deficit present.      Mental Status: She is alert and oriented to person, place, and time. Mental status is at baseline.   Psychiatric:         Mood and Affect: Mood normal.         Behavior: Behavior normal.         Thought Content: Thought content normal.         Judgment: Judgment normal.         Significant Labs:  CBC:   Recent Labs   Lab 12/21/20 0719   WBC 10.16   RBC 4.02   HGB 11.3*   HCT 35.5*      MCV 88   MCH 28.1   MCHC 31.8*     BMP:   Recent Labs   Lab 12/21/20 0719         K 3.5      CO2 30*   BUN 18   CREATININE 0.8   CALCIUM 7.8*     CMP:   Recent Labs   Lab 12/21/20 0719      CALCIUM 7.8*   ALBUMIN 2.8*   PROT 6.0      K 3.5   CO2 30*      BUN 18   CREATININE 0.8   ALKPHOS 53*   ALT 28   AST 19   BILITOT 0.6       Significant Diagnostics:  CT: I have reviewed all pertinent results/findings within the past 24 hours and my personal findings are:  thickened SB c/w enteritis

## 2020-12-22 NOTE — TRANSFER OF CARE
Anesthesia Transfer of Care Note    Patient: Sri Carrington    Procedure(s) Performed: Procedure(s) (LRB):  EGD (ESOPHAGOGASTRODUODENOSCOPY) (N/A)  COLONOSCOPY (N/A)    Patient location: Other: ENDO    Anesthesia Type: MAC    Transport from OR: Transported from OR on room air with adequate spontaneous ventilation    Post pain: adequate analgesia    Post assessment: no apparent anesthetic complications    Post vital signs: stable    Level of consciousness: awake    Nausea/Vomiting: no nausea/vomiting    Complications: none    Transfer of care protocol was followed      Last vitals:   104/55  16 RR  80 HR  100% O2 SAT

## 2020-12-22 NOTE — PROGRESS NOTES
[facility]  General Surgery  Progress Note  12/22/20    Subjective:     History of Present Illness:  Admitted for abdominal pain.  Reports pain is achy and sharp at the same time.  Reports it has been ongoing for at least a year.  GI treated her with Miralax for constipation.  No endoscopy in the past.  Some diarrhea and constipation.  Stool dark occasionally.  No BRBPR.  Nausea and vomiting even with liquids.      Post-Op Info:  Procedure(s) (LRB):  EGD, WITH CLOSED BIOPSY (N/A)  COLONOSCOPY (N/A)   Day of Surgery     Interval History: Threats to sign out AMA last night.  Finally stayed and took prep.  Nausea but no vomiting.  No BRBPR or melena    Medications:  Continuous Infusions:   sodium chloride 0.9% 125 mL/hr at 12/22/20 0522    sodium chloride 0.9%       Scheduled Meds:   ciprofloxacin (CIPRO)400mg/200ml D5W IVPB  400 mg Intravenous Q12H    famotidine (PF)  20 mg Intravenous Q12H    methylPREDNISolone sodium succinate  125 mg Intravenous Q6H    metoclopramide HCl  10 mg Oral TID AC    metronidazole  500 mg Intravenous Q8H     PRN Meds:HYDROmorphone, melatonin, ondansetron, promethazine (PHENERGAN) IVPB, sodium chloride 0.9%     Review of patient's allergies indicates:   Allergen Reactions    Pcn [penicillins] Hives     Objective:     Vital Signs (Most Recent):  Temp: 98.1 °F (36.7 °C) (12/22/20 1508)  Pulse: 69 (12/22/20 1508)  Resp: 18 (12/22/20 1508)  BP: (!) 154/92 (12/22/20 1508)  SpO2: 100 % (12/22/20 1508) Vital Signs (24h Range):  Temp:  [98.1 °F (36.7 °C)-98.6 °F (37 °C)] 98.1 °F (36.7 °C)  Pulse:  [64-87] 69  Resp:  [16-20] 18  SpO2:  [95 %-100 %] 100 %  BP: (124-175)/() 154/92     Weight: 74.2 kg (163 lb 8 oz)  Body mass index is 25.61 kg/m².    Intake/Output - Last 3 Shifts       12/20 0700 - 12/21 0659 12/21 0700 - 12/22 0659 12/22 0700 - 12/23 0659    P.O. 360 2240     I.V. (mL/kg) 397 (5.4) 2865 (38.6) 600 (8.1)    IV Piggyback 1550 400     Total Intake(mL/kg)  2307 (31.1) 5505 (74.2) 600 (8.1)    Urine (mL/kg/hr)  2000 (1.1)     Stool  0     Total Output  2000     Net +2307 +3505 +600           Urine Occurrence  3 x     Stool Occurrence 0 x 4 x           Physical Exam  Vitals signs and nursing note reviewed.   Constitutional:       General: She is not in acute distress.     Appearance: Normal appearance.   HENT:      Head: Normocephalic and atraumatic.      Mouth/Throat:      Mouth: Mucous membranes are moist.      Pharynx: Oropharynx is clear.   Eyes:      Extraocular Movements: Extraocular movements intact.      Pupils: Pupils are equal, round, and reactive to light.   Cardiovascular:      Rate and Rhythm: Normal rate and regular rhythm.      Heart sounds: Normal heart sounds. No murmur. No gallop.    Pulmonary:      Effort: Pulmonary effort is normal. No respiratory distress.      Breath sounds: Normal breath sounds. No wheezing.   Abdominal:      General: Abdomen is flat. Bowel sounds are normal. There is no distension.      Palpations: Abdomen is soft.      Tenderness: There is abdominal tenderness (mild diffuse). There is no guarding.   Skin:     General: Skin is warm and dry.   Neurological:      General: No focal deficit present.      Mental Status: She is oriented to person, place, and time. Mental status is at baseline.   Psychiatric:         Mood and Affect: Mood normal.         Behavior: Behavior normal.         Thought Content: Thought content normal.         Judgment: Judgment normal.         Significant Labs:  CBC:   Recent Labs   Lab 12/22/20  0522   WBC 11.48   RBC 4.56   HGB 13.1   HCT 40.4   *   MCV 89   MCH 28.7   MCHC 32.4     BMP:   Recent Labs   Lab 12/22/20 0522         K 3.6      CO2 28   BUN 8   CREATININE 0.8   CALCIUM 8.4*       Significant Diagnostics:  no new    Assessment/Plan:     * Enteritis  Colonoscopy unrevealing. Poor prep ? Constipation/ ileus  Antibiotics for infectious enteritis    Nausea and vomiting  EGD  reveals gastritis and undigested food  Check GB US       Lottie Villavicencio MD  General Surgery  [facility]

## 2020-12-22 NOTE — OP NOTE
[facility]  EGD/Colonoscopy Procedure  Operative Note    SUMMARY     Date of Procedure: 12/22/2020     Procedure: Procedure(s) (LRB):  EGD, WITH CLOSED BIOPSY (N/A)  COLONOSCOPY (N/A)    Surgeon(s) and Role:     * Lottie Villavicencio MD - Primary    Assisting Surgeon: None    Patient location: GI    Pre-Operative Diagnosis: Nausea and vomiting, intractability of vomiting not specified, unspecified vomiting type [R11.2]  Enteritis [K52.9]    Post-Operative Diagnosis: Post-Op Diagnosis Codes:     * Nausea and vomiting, intractability of vomiting not specified, unspecified vomiting type [R11.2]     * Enteritis [K52.9]  Gastritis  Poor prep     Indications: N/V, enteritis by CT          Procedure:                  The patient was brought in to the endoscopy suite where the risks, benefits, and alternatives of the procedure were described.  The patient was given the opportunity to ask questions and then signed informed consent. Patient was positioned in the left lateral decubitus position, continuous monitoring was initiated, and supplemental oxygen was provided via nasal cannula.  Bite block was placed.  Adequate sedation was achieved with the above mentioned medications and then titrated during the entire procedure.  Under direct visualization the gastroscope was introduced through the oropharynx in to the esophagus.  The scope was then advanced in to the stomach and second portion of the duodenum.  Scope was then withdrawn and the mucosa was carefully examined.  The entire gastric mucosa was examined, including the fundus with retroflexion.  Air was evacuated from the stomach and the scope was withdrawn in to the esophagus.  The entire esophageal mucosa was examined.  The patient tolerated the procedure well and was able to be transferred to the recovery area in stable condition.    Findings:                 Esophagus: GE junction at 42 cm and regular Z line.                       Stomach: Undigested  food and noted inflammation in the body of stomach.  Biopsies of antrum taken with cold forceps                    Duodenum: No mucosal abnormalities    Procedure:                  The patient was brought in to the endoscopy suite where the risks, benefits, and alternatives of the procedure were described.  The patient was given the opportunity to ask questions and then signed informed consent.  Patient was positioned in the left lateral decubitus position, continuous monitoring was initiated, and supplemental oxygen was provided via nasal cannula.  Adequate sedation was achieved with the above mentioned medications and then titrated during the entire procedure.  Digital rectal exam was performed.  Under direct visualization the colonoscope was introduced through the anus in to the rectum.  The scope was then advanced to the cecum, which was identified by the ileocecal valve and appendiceal orifice.  Scope was then withdrawn and the mucosa was carefully examined in a circular fashion.  The entire colonic mucosa was examined but limited due to liquid stool and particulate that clogged the scope.  Air was evacuated from the colon and the procedure was terminated.  The patient tolerated the procedure well and was able to be transferred to the recovery area in stable condition.    Findings:                 Digital rectal examination: Minimal external tags                     Rectum: No significant hemorrhoidal disease                    Sigmoid: No mucosal abnormalities        Descending: No mucosal abnormalities         Transverse: No mucosal abnormalities         Ascending: No mucosal abnormalities        Cecum: No mucosal abnormalities        Terminal Ileum: Normal mucosa.  No evidence of enteritis     Specimens:   Specimen (12h ago, onward)    Antral biopsy            Estimated Blood Loss (EBL): * No values recorded between 12/22/2020 12:00 AM and 12/22/2020  2:26 PM *     Complications: No     Diagnostic  Impression: Poor prep, Gastritis, Gastric bezoar     Recommendations: Resume previous diet.    Disposition: Recovery unit stable     Attestation: I performed the procedure.        Follow Up:             No future appointments.        Lottie Villavicencio MD  12/22/2020

## 2020-12-22 NOTE — SUBJECTIVE & OBJECTIVE
Interval History: Threats to sign out AMA last night.  Finally stayed and took prep.  Nausea but no vomiting.  No BRBPR or melena    Medications:  Continuous Infusions:   sodium chloride 0.9% 125 mL/hr at 12/22/20 0522    sodium chloride 0.9%       Scheduled Meds:   ciprofloxacin (CIPRO)400mg/200ml D5W IVPB  400 mg Intravenous Q12H    famotidine (PF)  20 mg Intravenous Q12H    methylPREDNISolone sodium succinate  125 mg Intravenous Q6H    metoclopramide HCl  10 mg Oral TID AC    metronidazole  500 mg Intravenous Q8H     PRN Meds:HYDROmorphone, melatonin, ondansetron, promethazine (PHENERGAN) IVPB, sodium chloride 0.9%     Review of patient's allergies indicates:   Allergen Reactions    Pcn [penicillins] Hives     Objective:     Vital Signs (Most Recent):  Temp: 98.1 °F (36.7 °C) (12/22/20 1508)  Pulse: 69 (12/22/20 1508)  Resp: 18 (12/22/20 1508)  BP: (!) 154/92 (12/22/20 1508)  SpO2: 100 % (12/22/20 1508) Vital Signs (24h Range):  Temp:  [98.1 °F (36.7 °C)-98.6 °F (37 °C)] 98.1 °F (36.7 °C)  Pulse:  [64-87] 69  Resp:  [16-20] 18  SpO2:  [95 %-100 %] 100 %  BP: (124-175)/() 154/92     Weight: 74.2 kg (163 lb 8 oz)  Body mass index is 25.61 kg/m².    Intake/Output - Last 3 Shifts       12/20 0700 - 12/21 0659 12/21 0700 - 12/22 0659 12/22 0700 - 12/23 0659    P.O. 360 2240     I.V. (mL/kg) 397 (5.4) 2865 (38.6) 600 (8.1)    IV Piggyback 1550 400     Total Intake(mL/kg) 2307 (31.1) 5505 (74.2) 600 (8.1)    Urine (mL/kg/hr)  2000 (1.1)     Stool  0     Total Output  2000     Net +2307 +3505 +600           Urine Occurrence  3 x     Stool Occurrence 0 x 4 x           Physical Exam  Vitals signs and nursing note reviewed.   Constitutional:       General: She is not in acute distress.     Appearance: Normal appearance.   HENT:      Head: Normocephalic and atraumatic.      Mouth/Throat:      Mouth: Mucous membranes are moist.      Pharynx: Oropharynx is clear.   Eyes:      Extraocular Movements: Extraocular  movements intact.      Pupils: Pupils are equal, round, and reactive to light.   Cardiovascular:      Rate and Rhythm: Normal rate and regular rhythm.      Heart sounds: Normal heart sounds. No murmur. No gallop.    Pulmonary:      Effort: Pulmonary effort is normal. No respiratory distress.      Breath sounds: Normal breath sounds. No wheezing.   Abdominal:      General: Abdomen is flat. Bowel sounds are normal. There is no distension.      Palpations: Abdomen is soft.      Tenderness: There is abdominal tenderness (mild diffuse). There is no guarding.   Skin:     General: Skin is warm and dry.   Neurological:      General: No focal deficit present.      Mental Status: She is oriented to person, place, and time. Mental status is at baseline.   Psychiatric:         Mood and Affect: Mood normal.         Behavior: Behavior normal.         Thought Content: Thought content normal.         Judgment: Judgment normal.         Significant Labs:  CBC:   Recent Labs   Lab 12/22/20  0522   WBC 11.48   RBC 4.56   HGB 13.1   HCT 40.4   *   MCV 89   MCH 28.7   MCHC 32.4     BMP:   Recent Labs   Lab 12/22/20 0522         K 3.6      CO2 28   BUN 8   CREATININE 0.8   CALCIUM 8.4*       Significant Diagnostics:  no new

## 2020-12-22 NOTE — ANESTHESIA PREPROCEDURE EVALUATION
12/22/2020  Sri Carrington is a 35 y.o., female.    Anesthesia Evaluation    I have reviewed the Patient Summary Reports.   I have reviewed the NPO Status.   I have reviewed the Medications.     Review of Systems  Anesthesia Hx:  No problems with previous Anesthesia  Denies Family Hx of Anesthesia complications.   Denies Personal Hx of Anesthesia complications.   Social:  Smoker    Cardiovascular:   Hypertension, poorly controlled HX MURMUR   Pulmonary:  Pulmonary Normal    Renal/:  Renal/ Normal     Hepatic/GI:   GERD, well controlled HX CHRONS   Neurological:  Neurology Normal    Endocrine:   Hyperthyroidism      Lab Results   Component Value Date    WBC 11.48 12/22/2020    HGB 13.1 12/22/2020    HCT 40.4 12/22/2020    MCV 89 12/22/2020     (H) 12/22/2020     CMP  Sodium   Date Value Ref Range Status   12/22/2020 141 136 - 145 mmol/L Final     Potassium   Date Value Ref Range Status   12/22/2020 3.6 3.5 - 5.1 mmol/L Final     Chloride   Date Value Ref Range Status   12/22/2020 107 95 - 110 mmol/L Final     CO2   Date Value Ref Range Status   12/22/2020 28 23 - 29 mmol/L Final     Glucose   Date Value Ref Range Status   12/22/2020 109 70 - 110 mg/dL Final     BUN   Date Value Ref Range Status   12/22/2020 8 6 - 20 mg/dL Final     Creatinine   Date Value Ref Range Status   12/22/2020 0.8 0.5 - 1.4 mg/dL Final     Calcium   Date Value Ref Range Status   12/22/2020 8.4 (L) 8.7 - 10.5 mg/dL Final     Total Protein   Date Value Ref Range Status   12/22/2020 7.6 6.0 - 8.4 g/dL Final     Albumin   Date Value Ref Range Status   12/22/2020 3.5 3.5 - 5.2 g/dL Final     Total Bilirubin   Date Value Ref Range Status   12/22/2020 0.6 0.1 - 1.0 mg/dL Final     Comment:     For infants and newborns, interpretation of results should be based  on gestational age, weight and in agreement with  "Pharmacy Chemotherapy Verification    Patient Name: Karlene Walters   Dx: Colon CA        Cycle 104 ([place] cycle 96)    Previous treatment: 2/25/20    Protocol: 5-FU     IV over 2 hours on Day 1, followed by    IVP on Day 1, followed by                               -- 10/31/17: delete Leucovorin and 5-FU push d/t neutropenia per Dr. Hardy   Fluorouracil  continuous infusion over 46-48 hours                              -- 20% reduction (1920 mg/m²) to be continued starting C28 per C Alsop APRN   14 day cycles for 12 cycles (adjuvant) or until disease progression or unacceptable toxicity  NCCN Guidelines for Colon Cancer. V.2.2015.  Jay T, et al. Eur J Cancer.1999;35(9):1343-7.      Allergies: Codeine; Oxaliplatin; Pcn; Sulfa drugs; and Tape     /65   Pulse (!) 106   Temp 36.4 °C (97.5 °F) (Temporal)   Resp 18   Ht 1.638 m (5' 4.5\")   Wt 76.2 kg (167 lb 15.9 oz)   LMP  (LMP Unknown)   SpO2 99%   BMI 28.39 kg/m²  Body surface area is 1.86 meters squared.    Labs 4/7/20:  ANC~ 1520 Plt = 355k   Hgb = 13.1     SCr = 1.09 mg/dL CrCl ~ 50 mL/min   AST/ALT/ALK = 28/26/165 TBili = 0.5     Fluorouracil (5-FU) 1920 mg/m² x 1.86 m² = 3571.2 mg              <10% difference, okay to treat with final dose = 3570 mg    CIVI via CADD pump @ 1.6 mL/hr over 46 hrs    Asha Krishnan, PharmD                            " clinical  observations.  Premature Infant recommended reference ranges:  Up to 24 hours.............<8.0 mg/dL  Up to 48 hours............<12.0 mg/dL  3-5 days..................<15.0 mg/dL  6-29 days.................<15.0 mg/dL  For patients on Eltrombopag therapy, use of Dimension [place] TBIL is   not   recommended.       Alkaline Phosphatase   Date Value Ref Range Status   12/22/2020 66 55 - 135 U/L Final     AST   Date Value Ref Range Status   12/22/2020 33 10 - 40 U/L Final     ALT   Date Value Ref Range Status   12/22/2020 44 10 - 44 U/L Final     Anion Gap   Date Value Ref Range Status   12/22/2020 6 (L) 8 - 16 mmol/L Final     eGFR if    Date Value Ref Range Status   12/22/2020 >60.0 >60 mL/min/1.73 m^2 Final     eGFR if non    Date Value Ref Range Status   12/22/2020 >60.0 >60 mL/min/1.73 m^2 Final     Comment:     Calculation used to obtain the estimated glomerular filtration  rate (eGFR) is the CKD-EPI equation.          Physical Exam  General:  Well nourished    Airway/Jaw/Neck:  Airway Findings: Mouth Opening: Normal Tongue: Normal  General Airway Assessment: Adult  Mallampati: II  Improves to II with phonation.  TM Distance: Normal, at least 6 cm  Jaw/Neck Findings:  Neck ROM: Normal ROM      Dental:  Dental Findings: In tact    Chest/Lungs:  Chest/Lungs Findings: Clear to auscultation, Normal Respiratory Rate     Heart/Vascular:  Heart Findings: Rate: Normal  Rhythm: Regular Rhythm  Sounds: Normal        Mental Status:  Mental Status Findings:  Cooperative         Anesthesia Plan  Type of Anesthesia, risks & benefits discussed:  Anesthesia Type:  MAC  Patient's Preference:   Intra-op Monitoring Plan: standard ASA monitors  Intra-op Monitoring Plan Comments:   Post Op Pain Control Plan: multimodal analgesia  Post Op Pain Control Plan Comments:   Induction:    Beta Blocker:  Patient is not currently on a Beta-Blocker (No further documentation required).       Informed  Consent: Patient understands risks and agrees with Anesthesia plan.  Questions answered. Anesthesia consent signed with patient.  ASA Score: 3     Day of Surgery Review of History & Physical: I have interviewed and examined the patient. I have reviewed the patient's H&P dated:  There are no significant changes.  H&P update referred to the surgeon.         Ready For Surgery From Anesthesia Perspective.

## 2020-12-22 NOTE — ASSESSMENT & PLAN NOTE
Infectious versus inflammatory versus other.  To undergo upper and lower endoscopy by general surgery.  We appreciate their input.    Medication includes: Ciprofloxacin and Flagyl.    C. Difficile screen pending.

## 2020-12-22 NOTE — NURSING
Received to unit via bed, awake alert, ambulated to bathroom and  voided, report from Andrez CHRISTOPHER

## 2020-12-22 NOTE — CONSULTS
[facility]  General Surgery  Consult Note    Patient Name: Sri Carrington  MRN: [id number]  Code Status: Full Code  Admission Date: 12/20/2020  Hospital Length of Stay: 0 days  Attending Physician: Foreign Klein Jr., MD  Primary Care Provider: [facility]    Patient information was obtained from patient, past medical records and ER records.     Inpatient consult to General Surgery  Consult performed by: Lottie Villavicencio MD  Consult ordered by: Foreign Klein Jr., MD  Reason for consult: Enteritis, nausea and vomiting        Subjective:     Principal Problem: Enteritis    History of Present Illness: Admitted for abdominal pain.  Reports pain is achy and sharp at the same time.  Reports it has been ongoing for at least a year.  GI treated her with Miralax for constipation.  No endoscopy in the past.  Some diarrhea and constipation.  Stool dark occasionally.  No BRBPR.  Nausea and vomiting even with liquids.      No current facility-administered medications on file prior to encounter.      Current Outpatient Medications on File Prior to Encounter   Medication Sig    CAMRESE 0.15 mg-30 mcg (84)/10 mcg (7) 3MPk TAKE ONE TABLET BY MOUTH ONCE A DAY. THANK YOU    dicyclomine (BENTYL) 10 MG capsule TAKE TWO CAPSULES BY MOUTH EVERY 6 HOURS AS NEEDED FOR abdominal cramps THANK YOU    levothyroxine (SYNTHROID) 75 MCG tablet Take 75 mcg by mouth before breakfast.    lisinopriL (PRINIVIL,ZESTRIL) 40 MG tablet Take 40 mg by mouth once daily.    methocarbamol (ROBAXIN) 500 MG Tab TAKE TWO TABLETS BY MOUTH THREE TIMES DAILY AS NEEDED FOR MUSCLE SPASMS. THANK YOU    omeprazole (PRILOSEC) 40 MG capsule TAKE ONE CAPSULE BY MOUTH ONCE A DAY FOR stomach THANK YOU    pantoprazole (PROTONIX) 40 mg suspension Take 40 mg by mouth once daily.    triamcinolone acetonide 0.1% (KENALOG) 0.1 % cream Apply topical TWICE A DAY THANK YOU       Review of patient's allergies  indicates:   Allergen Reactions    Pcn [penicillins] Hives       Past Medical History:   Diagnosis Date    Chronic constipation     Crohn's colitis     GERD (gastroesophageal reflux disease)     Hypertension     IBS (irritable bowel syndrome)     Thyroid disease      Past Surgical History:   Procedure Laterality Date    BTL       Family History     None        Tobacco Use    Smoking status: Current Every Day Smoker     Packs/day: 1.00     Types: Cigarettes    Smokeless tobacco: Never Used   Substance and Sexual Activity    Alcohol use: Never     Frequency: Never    Drug use: Not Currently     Comment: Hx opiates-quit 2015    Sexual activity: Not on file     Review of Systems   Constitutional: Positive for appetite change and fatigue. Negative for chills.   HENT: Negative for congestion, sore throat and trouble swallowing.    Respiratory: Negative for apnea, choking, shortness of breath, wheezing and stridor.    Cardiovascular: Negative for palpitations and leg swelling.   Gastrointestinal: Positive for abdominal pain, nausea and vomiting. Negative for abdominal distention and blood in stool.   Endocrine: Negative for cold intolerance and heat intolerance.   Genitourinary: Positive for pelvic pain.   Musculoskeletal: Negative for arthralgias, back pain and myalgias.   Skin: Negative for pallor and rash.   Neurological: Positive for weakness.   Psychiatric/Behavioral: Negative for behavioral problems, dysphoric mood and sleep disturbance.   All other systems reviewed and are negative.    Objective:     Vital Signs (Most Recent):  Temp: 98.2 °F (36.8 °C) (12/21/20 1601)  Pulse: 75 (12/21/20 1601)  Resp: 20 (12/21/20 1601)  BP: (!) 155/81 (12/21/20 1601)  SpO2: 98 % (12/21/20 1601) Vital Signs (24h Range):  Temp:  [97.8 °F (36.6 °C)-99 °F (37.2 °C)] 98.2 °F (36.8 °C)  Pulse:  [18-98] 75  Resp:  [12-20] 20  SpO2:  [98 %-100 %] 98 %  BP: (118-169)/(67-93) 155/81     Weight: 74.2 kg (163 lb 8 oz)  Body mass  index is 25.61 kg/m².    Physical Exam  Vitals signs and nursing note reviewed.   Constitutional:       General: She is not in acute distress.  HENT:      Head: Normocephalic and atraumatic.      Nose: Nose normal.      Mouth/Throat:      Mouth: Mucous membranes are moist.      Pharynx: Oropharynx is clear.   Eyes:      General: No scleral icterus.     Extraocular Movements: Extraocular movements intact.      Conjunctiva/sclera: Conjunctivae normal.      Pupils: Pupils are equal, round, and reactive to light.   Neck:      Musculoskeletal: Normal range of motion and neck supple.   Cardiovascular:      Rate and Rhythm: Normal rate and regular rhythm.      Pulses: Normal pulses.      Heart sounds: Normal heart sounds. No murmur. No gallop.    Pulmonary:      Effort: Pulmonary effort is normal. No respiratory distress.      Breath sounds: Normal breath sounds. No stridor. No wheezing, rhonchi or rales.   Abdominal:      General: Abdomen is flat. Bowel sounds are normal. There is no distension.      Palpations: Abdomen is soft.      Tenderness: There is abdominal tenderness.   Musculoskeletal: Normal range of motion.         General: No swelling.   Skin:     General: Skin is warm and dry.   Neurological:      General: No focal deficit present.      Mental Status: She is alert and oriented to person, place, and time. Mental status is at baseline.   Psychiatric:         Mood and Affect: Mood normal.         Behavior: Behavior normal.         Thought Content: Thought content normal.         Judgment: Judgment normal.         Significant Labs:  CBC:   Recent Labs   Lab 12/21/20 0719   WBC 10.16   RBC 4.02   HGB 11.3*   HCT 35.5*      MCV 88   MCH 28.1   MCHC 31.8*     BMP:   Recent Labs   Lab 12/21/20 0719         K 3.5      CO2 30*   BUN 18   CREATININE 0.8   CALCIUM 7.8*     CMP:   Recent Labs   Lab 12/21/20 0719      CALCIUM 7.8*   ALBUMIN 2.8*   PROT 6.0      K 3.5   CO2 30*   CL  108   BUN 18   CREATININE 0.8   ALKPHOS 53*   ALT 28   AST 19   BILITOT 0.6       Significant Diagnostics:  CT: I have reviewed all pertinent results/findings within the past 24 hours and my personal findings are:  thickened SB c/w enteritis    Assessment/Plan:     * Enteritis  Needs colonoscopy.  Antibiotics for infectious enteritis    Nausea and vomiting  Needs EGD  Check GB US       VTE Risk Mitigation (From admission, onward)         Ordered     IP VTE LOW RISK PATIENT  Once      12/21/20 0052                Thank you for your consult. I will follow-up with patient. Please contact us if you have any additional questions.    Lottie Villavicencio MD  General Surgery  [facility]

## 2020-12-22 NOTE — SUBJECTIVE & OBJECTIVE
Interval History: no acute events overnight.    Review of Systems   Constitutional: Positive for fatigue. Negative for chills and fever.   HENT: Negative for rhinorrhea, sneezing and sore throat.    Eyes: Negative for pain and visual disturbance.   Respiratory: Negative for cough and shortness of breath.    Cardiovascular: Negative for chest pain.   Gastrointestinal: Positive for abdominal distention, abdominal pain, blood in stool, diarrhea, nausea and vomiting. Negative for constipation.   Endocrine: Negative for cold intolerance and heat intolerance.   Genitourinary: Negative for difficulty urinating.   Musculoskeletal: Negative for arthralgias and joint swelling.   Skin: Negative for rash.   Allergic/Immunologic: Negative for environmental allergies.   Neurological: Negative for dizziness, syncope and weakness.   Hematological: Does not bruise/bleed easily.   Psychiatric/Behavioral: Positive for sleep disturbance. Negative for dysphoric mood. The patient is not nervous/anxious.      Objective:     Vital Signs (Most Recent):  Temp: 98.4 °F (36.9 °C) (12/22/20 1102)  Pulse: 66 (12/22/20 1102)  Resp: 18 (12/22/20 1102)  BP: (!) 155/81 (12/22/20 1102)  SpO2: 99 % (12/22/20 1102) Vital Signs (24h Range):  Temp:  [98.2 °F (36.8 °C)-98.6 °F (37 °C)] 98.4 °F (36.9 °C)  Pulse:  [64-87] 66  Resp:  [18-20] 18  SpO2:  [95 %-100 %] 99 %  BP: (124-175)/() 155/81     Weight: 74.2 kg (163 lb 8 oz)  Body mass index is 25.61 kg/m².    Intake/Output Summary (Last 24 hours) at 12/22/2020 1202  Last data filed at 12/22/2020 0500  Gross per 24 hour   Intake 5405 ml   Output 2000 ml   Net 3405 ml      Physical Exam  Vitals signs and nursing note reviewed.   Constitutional:       Appearance: Normal appearance. She is ill-appearing.   HENT:      Head: Normocephalic and atraumatic.      Nose: Nose normal.   Eyes:      Extraocular Movements: Extraocular movements intact.      Pupils: Pupils are equal, round, and reactive to light.    Neck:      Musculoskeletal: Normal range of motion and neck supple.   Cardiovascular:      Rate and Rhythm: Normal rate and regular rhythm.      Heart sounds: No murmur. No friction rub. No gallop.    Pulmonary:      Effort: Pulmonary effort is normal.      Breath sounds: Normal breath sounds.   Abdominal:      General: Abdomen is flat. Bowel sounds are normal.      Palpations: Abdomen is soft.      Tenderness: There is abdominal tenderness. There is guarding. There is no rebound.   Musculoskeletal:         General: No swelling or deformity.   Skin:     General: Skin is warm and dry.      Capillary Refill: Capillary refill takes less than 2 seconds.   Neurological:      General: No focal deficit present.      Mental Status: She is alert and oriented to person, place, and time.   Psychiatric:         Mood and Affect: Mood normal.         Behavior: Behavior normal.         Significant Labs: All pertinent labs within the past 24 hours have been reviewed.    Significant Imaging: I have reviewed and interpreted all pertinent imaging results/findings within the past 24 hours.

## 2020-12-23 LAB
ALBUMIN SERPL BCP-MCNC: 2.9 G/DL (ref 3.5–5.2)
ALP SERPL-CCNC: 51 U/L (ref 55–135)
ALT SERPL W/O P-5'-P-CCNC: 56 U/L (ref 10–44)
ANION GAP SERPL CALC-SCNC: 7 MMOL/L (ref 8–16)
AST SERPL-CCNC: 45 U/L (ref 10–40)
BASOPHILS # BLD AUTO: 0.01 K/UL (ref 0–0.2)
BASOPHILS NFR BLD: 0.1 % (ref 0–1.9)
BILIRUB SERPL-MCNC: 0.5 MG/DL (ref 0.1–1)
BUN SERPL-MCNC: 13 MG/DL (ref 6–20)
CALCIUM SERPL-MCNC: 8 MG/DL (ref 8.7–10.5)
CHLORIDE SERPL-SCNC: 110 MMOL/L (ref 95–110)
CO2 SERPL-SCNC: 25 MMOL/L (ref 23–29)
CREAT SERPL-MCNC: 0.8 MG/DL (ref 0.5–1.4)
DIFFERENTIAL METHOD: ABNORMAL
EOSINOPHIL # BLD AUTO: 0 K/UL (ref 0–0.5)
EOSINOPHIL NFR BLD: 0 % (ref 0–8)
ERYTHROCYTE [DISTWIDTH] IN BLOOD BY AUTOMATED COUNT: 11.9 % (ref 11.5–14.5)
EST. GFR  (AFRICAN AMERICAN): >60 ML/MIN/1.73 M^2
EST. GFR  (NON AFRICAN AMERICAN): >60 ML/MIN/1.73 M^2
GLUCOSE SERPL-MCNC: 120 MG/DL (ref 70–110)
HCT VFR BLD AUTO: 34.1 % (ref 37–48.5)
HGB BLD-MCNC: 11.1 G/DL (ref 12–16)
IMM GRANULOCYTES # BLD AUTO: 0.08 K/UL (ref 0–0.04)
IMM GRANULOCYTES NFR BLD AUTO: 0.6 % (ref 0–0.5)
LYMPHOCYTES # BLD AUTO: 1.1 K/UL (ref 1–4.8)
LYMPHOCYTES NFR BLD: 8.6 % (ref 18–48)
MCH RBC QN AUTO: 28.5 PG (ref 27–31)
MCHC RBC AUTO-ENTMCNC: 32.6 G/DL (ref 32–36)
MCV RBC AUTO: 87 FL (ref 82–98)
MONOCYTES # BLD AUTO: 0.4 K/UL (ref 0.3–1)
MONOCYTES NFR BLD: 3 % (ref 4–15)
NEUTROPHILS # BLD AUTO: 11.2 K/UL (ref 1.8–7.7)
NEUTROPHILS NFR BLD: 87.7 % (ref 38–73)
NRBC BLD-RTO: 0 /100 WBC
PLATELET # BLD AUTO: 298 K/UL (ref 150–350)
PMV BLD AUTO: 10.3 FL (ref 9.2–12.9)
POTASSIUM SERPL-SCNC: 3.7 MMOL/L (ref 3.5–5.1)
PROT SERPL-MCNC: 6 G/DL (ref 6–8.4)
RBC # BLD AUTO: 3.9 M/UL (ref 4–5.4)
SODIUM SERPL-SCNC: 142 MMOL/L (ref 136–145)
WBC # BLD AUTO: 12.74 K/UL (ref 3.9–12.7)

## 2020-12-23 PROCEDURE — 11000001 HC ACUTE MED/SURG PRIVATE ROOM

## 2020-12-23 PROCEDURE — 96361 HYDRATE IV INFUSION ADD-ON: CPT | Performed by: EMERGENCY MEDICINE

## 2020-12-23 PROCEDURE — 25000003 PHARM REV CODE 250: Performed by: SURGERY

## 2020-12-23 PROCEDURE — 63600175 PHARM REV CODE 636 W HCPCS: Performed by: SURGERY

## 2020-12-23 PROCEDURE — 36415 COLL VENOUS BLD VENIPUNCTURE: CPT

## 2020-12-23 PROCEDURE — S0030 INJECTION, METRONIDAZOLE: HCPCS | Performed by: SURGERY

## 2020-12-23 PROCEDURE — 80053 COMPREHEN METABOLIC PANEL: CPT

## 2020-12-23 PROCEDURE — 85025 COMPLETE CBC W/AUTO DIFF WBC: CPT

## 2020-12-23 PROCEDURE — 25000003 PHARM REV CODE 250: Performed by: INTERNAL MEDICINE

## 2020-12-23 RX ORDER — LISINOPRIL 20 MG/1
20 TABLET ORAL DAILY
Status: DISCONTINUED | OUTPATIENT
Start: 2020-12-24 | End: 2020-12-25 | Stop reason: HOSPADM

## 2020-12-23 RX ORDER — LISINOPRIL 20 MG/1
20 TABLET ORAL DAILY
Status: COMPLETED | OUTPATIENT
Start: 2020-12-23 | End: 2020-12-23

## 2020-12-23 RX ORDER — HYDROMORPHONE HYDROCHLORIDE 2 MG/ML
2 INJECTION, SOLUTION INTRAMUSCULAR; INTRAVENOUS; SUBCUTANEOUS EVERY 4 HOURS PRN
Status: DISCONTINUED | OUTPATIENT
Start: 2020-12-23 | End: 2020-12-25 | Stop reason: HOSPADM

## 2020-12-23 RX ORDER — LISINOPRIL 10 MG/1
10 TABLET ORAL DAILY
Status: DISCONTINUED | OUTPATIENT
Start: 2020-12-24 | End: 2020-12-23

## 2020-12-23 RX ORDER — AMLODIPINE BESYLATE 5 MG/1
5 TABLET ORAL DAILY
Status: DISCONTINUED | OUTPATIENT
Start: 2020-12-23 | End: 2020-12-24

## 2020-12-23 RX ORDER — OXYCODONE AND ACETAMINOPHEN 10; 325 MG/1; MG/1
1 TABLET ORAL EVERY 6 HOURS PRN
Status: DISCONTINUED | OUTPATIENT
Start: 2020-12-23 | End: 2020-12-25 | Stop reason: HOSPADM

## 2020-12-23 RX ADMIN — VANCOMYCIN HYDROCHLORIDE 125 MG: KIT at 12:12

## 2020-12-23 RX ADMIN — FAMOTIDINE 20 MG: 10 INJECTION INTRAVENOUS at 09:12

## 2020-12-23 RX ADMIN — LIDOCAINE HYDROCHLORIDE: 20 SOLUTION ORAL; TOPICAL at 08:12

## 2020-12-23 RX ADMIN — METRONIDAZOLE 500 MG: 500 INJECTION, SOLUTION INTRAVENOUS at 09:12

## 2020-12-23 RX ADMIN — METHYLPREDNISOLONE SODIUM SUCCINATE 125 MG: 125 INJECTION, POWDER, FOR SOLUTION INTRAMUSCULAR; INTRAVENOUS at 12:12

## 2020-12-23 RX ADMIN — METHYLPREDNISOLONE SODIUM SUCCINATE 125 MG: 125 INJECTION, POWDER, FOR SOLUTION INTRAMUSCULAR; INTRAVENOUS at 05:12

## 2020-12-23 RX ADMIN — HYDROMORPHONE HYDROCHLORIDE 1 MG: 1 INJECTION, SOLUTION INTRAMUSCULAR; INTRAVENOUS; SUBCUTANEOUS at 02:12

## 2020-12-23 RX ADMIN — FAMOTIDINE 20 MG: 10 INJECTION INTRAVENOUS at 10:12

## 2020-12-23 RX ADMIN — AMLODIPINE BESYLATE 5 MG: 5 TABLET ORAL at 09:12

## 2020-12-23 RX ADMIN — HYDROMORPHONE HYDROCHLORIDE 1 MG: 1 INJECTION, SOLUTION INTRAMUSCULAR; INTRAVENOUS; SUBCUTANEOUS at 10:12

## 2020-12-23 RX ADMIN — METRONIDAZOLE 500 MG: 500 INJECTION, SOLUTION INTRAVENOUS at 12:12

## 2020-12-23 RX ADMIN — CIPROFLOXACIN 400 MG: 2 INJECTION, SOLUTION INTRAVENOUS at 12:12

## 2020-12-23 RX ADMIN — SODIUM CHLORIDE: 0.9 INJECTION, SOLUTION INTRAVENOUS at 04:12

## 2020-12-23 RX ADMIN — LISINOPRIL 20 MG: 20 TABLET ORAL at 11:12

## 2020-12-23 RX ADMIN — VANCOMYCIN HYDROCHLORIDE 125 MG: KIT at 09:12

## 2020-12-23 RX ADMIN — SODIUM CHLORIDE: 0.9 INJECTION, SOLUTION INTRAVENOUS at 06:12

## 2020-12-23 RX ADMIN — CIPROFLOXACIN 400 MG: 2 INJECTION, SOLUTION INTRAVENOUS at 01:12

## 2020-12-23 RX ADMIN — OXYCODONE HYDROCHLORIDE AND ACETAMINOPHEN 1 TABLET: 10; 325 TABLET ORAL at 09:12

## 2020-12-23 RX ADMIN — VANCOMYCIN HYDROCHLORIDE 125 MG: KIT at 05:12

## 2020-12-23 RX ADMIN — METRONIDAZOLE 500 MG: 500 INJECTION, SOLUTION INTRAVENOUS at 04:12

## 2020-12-23 NOTE — UM SECONDARY REVIEW
Physician Advisor Internal    PA - Medical Necessity Issue    Approved Inpatient     Per physician advisor, patient meets inpatient due to patient needing IV abx for c-diff.  Patient continues to report abdominal pain w/po intake.  Continues to require medical treatment and evaluation.  EGD will be performed.

## 2020-12-23 NOTE — SUBJECTIVE & OBJECTIVE
Interval History: no acute events overnight.    Review of Systems   Constitutional: Positive for fatigue. Negative for chills and fever.   HENT: Negative for rhinorrhea, sneezing and sore throat.    Eyes: Negative for pain and visual disturbance.   Respiratory: Negative for cough and shortness of breath.    Cardiovascular: Negative for chest pain.   Gastrointestinal: Positive for abdominal distention, abdominal pain, blood in stool, diarrhea, nausea and vomiting. Negative for constipation.   Endocrine: Negative for cold intolerance and heat intolerance.   Genitourinary: Negative for difficulty urinating.   Musculoskeletal: Negative for arthralgias and joint swelling.   Skin: Negative for rash.   Allergic/Immunologic: Negative for environmental allergies.   Neurological: Negative for dizziness, syncope and weakness.   Hematological: Does not bruise/bleed easily.   Psychiatric/Behavioral: Positive for sleep disturbance. Negative for dysphoric mood. The patient is not nervous/anxious.      Objective:     Vital Signs (Most Recent):  Temp: 98.3 °F (36.8 °C) (12/23/20 1216)  Pulse: 73 (12/23/20 1216)  Resp: 20 (12/23/20 1216)  BP: (!) 176/85 (12/23/20 1216)  SpO2: 99 % (12/23/20 1216) Vital Signs (24h Range):  Temp:  [97.6 °F (36.4 °C)-99.3 °F (37.4 °C)] 98.3 °F (36.8 °C)  Pulse:  [62-84] 73  Resp:  [16-20] 20  SpO2:  [95 %-100 %] 99 %  BP: (154-192)/(81-92) 176/85     Weight: 74.2 kg (163 lb 8 oz)  Body mass index is 25.61 kg/m².    Intake/Output Summary (Last 24 hours) at 12/23/2020 1314  Last data filed at 12/23/2020 0500  Gross per 24 hour   Intake 5276 ml   Output --   Net 5276 ml      Physical Exam  Vitals signs and nursing note reviewed.   Constitutional:       Appearance: Normal appearance. She is ill-appearing.   HENT:      Head: Normocephalic and atraumatic.      Nose: Nose normal.   Eyes:      Extraocular Movements: Extraocular movements intact.      Pupils: Pupils are equal, round, and reactive to light.    Neck:      Musculoskeletal: Normal range of motion and neck supple.   Cardiovascular:      Rate and Rhythm: Normal rate and regular rhythm.      Heart sounds: No murmur. No friction rub. No gallop.    Pulmonary:      Effort: Pulmonary effort is normal.      Breath sounds: Normal breath sounds.   Abdominal:      General: Abdomen is flat. Bowel sounds are normal.      Palpations: Abdomen is soft.      Tenderness: There is abdominal tenderness. There is guarding. There is no rebound.   Musculoskeletal:         General: No swelling or deformity.   Skin:     General: Skin is warm and dry.      Capillary Refill: Capillary refill takes less than 2 seconds.   Neurological:      General: No focal deficit present.      Mental Status: She is alert and oriented to person, place, and time.   Psychiatric:         Mood and Affect: Mood normal.         Behavior: Behavior normal.         Significant Labs: All pertinent labs within the past 24 hours have been reviewed.    Significant Imaging: I have reviewed and interpreted all pertinent imaging results/findings within the past 24 hours.

## 2020-12-23 NOTE — PROGRESS NOTES
[facility] Hospital Medicine  Progress Note    Patient Name: Sri Carrington  MRN: [id number]  Patient Class: IP- Inpatient   Admission Date: 12/20/2020  Length of Stay: 0 days  Attending Physician: Foreign Klein Jr., MD  Primary Care Provider: [facility]        Subjective:     Principal Problem:Enteritis        HPI:  Chief Complaint   Patient presents with    Abdominal Pain       Diffuse abdominal pain x 3 days.     Nausea    Emesis    Diarrhea     She has an underlying history of irritable bowel syndrome, was found at one point to have mild bowel wall thickening on CT scan, possibly reflective of an enteritis, and has been considered possibly to have Crohn's disease.  She has not had a colonoscopy.  She has had chronic constipation and previous opiate abuse.  Smokes cigarettes.  Underlying history of thyroid disease, hypertension, gastroesophageal reflux.  Reports she has been followed by Gastroenterology in home a, is moving to [place] and transferring care there.  Continues to take medications as listed, is not on any specific medications for inflammatory bowel disease.  Does have any history of bleeding.  Here for exacerbation of gastrointestinal symptoms as she has had before, present for 2 or 3 days, diffuse abdominal pain with nausea and vomiting, no diarrhea or constipation.  No fever, urinary symptoms, back pain, chest pain, dyspnea, or other localizing complaints.  States she has had no pain medication of any kind recently.     The history is provided by the patient. No  was used.          Overview/Hospital Course:  12/22/2020:  Patient continues to endorse some abdominal discomfort.  There may be some slight improvement from yesterday but overall her upper abdominal and lower abdominal pain persists.  She has been seen by general surgery.  Plan for endoscopies today.    12/23/2020:  No acute events overnight.  The  patient's C. Difficile toxin has come back positive.  We will begin oral vancomycin.  Upper and lower endoscopies performed.    Interval History: no acute events overnight.    Review of Systems   Constitutional: Positive for fatigue. Negative for chills and fever.   HENT: Negative for rhinorrhea, sneezing and sore throat.    Eyes: Negative for pain and visual disturbance.   Respiratory: Negative for cough and shortness of breath.    Cardiovascular: Negative for chest pain.   Gastrointestinal: Positive for abdominal distention, abdominal pain, blood in stool, diarrhea, nausea and vomiting. Negative for constipation.   Endocrine: Negative for cold intolerance and heat intolerance.   Genitourinary: Negative for difficulty urinating.   Musculoskeletal: Negative for arthralgias and joint swelling.   Skin: Negative for rash.   Allergic/Immunologic: Negative for environmental allergies.   Neurological: Negative for dizziness, syncope and weakness.   Hematological: Does not bruise/bleed easily.   Psychiatric/Behavioral: Positive for sleep disturbance. Negative for dysphoric mood. The patient is not nervous/anxious.      Objective:     Vital Signs (Most Recent):  Temp: 98.3 °F (36.8 °C) (12/23/20 1216)  Pulse: 73 (12/23/20 1216)  Resp: 20 (12/23/20 1216)  BP: (!) 176/85 (12/23/20 1216)  SpO2: 99 % (12/23/20 1216) Vital Signs (24h Range):  Temp:  [97.6 °F (36.4 °C)-99.3 °F (37.4 °C)] 98.3 °F (36.8 °C)  Pulse:  [62-84] 73  Resp:  [16-20] 20  SpO2:  [95 %-100 %] 99 %  BP: (154-192)/(81-92) 176/85     Weight: 74.2 kg (163 lb 8 oz)  Body mass index is 25.61 kg/m².    Intake/Output Summary (Last 24 hours) at 12/23/2020 1314  Last data filed at 12/23/2020 0500  Gross per 24 hour   Intake 5276 ml   Output --   Net 5276 ml      Physical Exam  Vitals signs and nursing note reviewed.   Constitutional:       Appearance: Normal appearance. She is ill-appearing.   HENT:      Head: Normocephalic and atraumatic.      Nose: Nose normal.    Eyes:      Extraocular Movements: Extraocular movements intact.      Pupils: Pupils are equal, round, and reactive to light.   Neck:      Musculoskeletal: Normal range of motion and neck supple.   Cardiovascular:      Rate and Rhythm: Normal rate and regular rhythm.      Heart sounds: No murmur. No friction rub. No gallop.    Pulmonary:      Effort: Pulmonary effort is normal.      Breath sounds: Normal breath sounds.   Abdominal:      General: Abdomen is flat. Bowel sounds are normal.      Palpations: Abdomen is soft.      Tenderness: There is abdominal tenderness. There is guarding. There is no rebound.   Musculoskeletal:         General: No swelling or deformity.   Skin:     General: Skin is warm and dry.      Capillary Refill: Capillary refill takes less than 2 seconds.   Neurological:      General: No focal deficit present.      Mental Status: She is alert and oriented to person, place, and time.   Psychiatric:         Mood and Affect: Mood normal.         Behavior: Behavior normal.         Significant Labs: All pertinent labs within the past 24 hours have been reviewed.    Significant Imaging: I have reviewed and interpreted all pertinent imaging results/findings within the past 24 hours.      Assessment/Plan:      * Enteritis  Infectious versus inflammatory versus other.  To undergo upper and lower endoscopy by general surgery.  We appreciate their input.    Medication includes: Ciprofloxacin and Flagyl.    C. Difficile screen positive.  We will add oral vancomycin 12/23/2020.    Colonoscopy reveals no mucosal abnormalities or evidence of enteritis.  Upper endoscopy revealed some undigested food and inflammation in the body of the stomach.  Because abnormalities were detected.      Polysubstance abuse  Patient counseled on the health consequences associated with amphetamine and marijuana abuse she endorses understanding.      Nausea and vomiting  This is been intractable.  Continue antibiotics as needed.   Continue gentle IV fluids        VTE Risk Mitigation (From admission, onward)         Ordered     IP VTE LOW RISK PATIENT  Once      12/21/20 0052                Discharge Planning   JACKIE:      Code Status: Full Code   Is the patient medically ready for discharge?:     Reason for patient still in hospital (select all that apply): Treatment  Discharge Plan A: Home                  Foreign Klein Jr, MD  Department of Hospital Medicine   [facility]

## 2020-12-23 NOTE — ANESTHESIA POSTPROCEDURE EVALUATION
Anesthesia Post Evaluation    Patient: Sri Carrington    Procedure(s) Performed: Procedure(s) (LRB):  EGD, WITH CLOSED BIOPSY (N/A)  COLONOSCOPY (N/A)    Final Anesthesia Type: MAC      Patient location during evaluation: med/surg floor  Patient participation: Yes- Able to Participate  Level of consciousness: awake  Post-procedure vital signs: reviewed and stable  Pain management: adequate  Airway patency: patent    PONV status at discharge: No PONV  Anesthetic complications: no      Cardiovascular status: blood pressure returned to baseline  Respiratory status: spontaneous ventilation  Hydration status: euvolemic  Follow-up not needed.          Vitals Value Taken Time   /92 12/23/20 0714   Temp 37.4 °C (99.3 °F) 12/23/20 0714   Pulse 62 12/23/20 0714   Resp 20 12/23/20 0714   SpO2 95 % 12/23/20 0714         No case tracking events are documented in the log.      Pain/Brigitte Score: Pain Rating Prior to Med Admin: 6 (12/22/2020  9:44 PM)  Pain Rating Post Med Admin: 1 (12/22/2020 10:05 PM)  Brigitte Score: 10 (12/22/2020  2:30 PM)

## 2020-12-24 LAB
ALBUMIN SERPL BCP-MCNC: 3.3 G/DL (ref 3.5–5.2)
ALP SERPL-CCNC: 54 U/L (ref 55–135)
ALT SERPL W/O P-5'-P-CCNC: 55 U/L (ref 10–44)
ANION GAP SERPL CALC-SCNC: 5 MMOL/L (ref 8–16)
AST SERPL-CCNC: 24 U/L (ref 10–40)
BASOPHILS # BLD AUTO: 0.01 K/UL (ref 0–0.2)
BASOPHILS NFR BLD: 0.1 % (ref 0–1.9)
BILIRUB SERPL-MCNC: 0.4 MG/DL (ref 0.1–1)
BUN SERPL-MCNC: 16 MG/DL (ref 6–20)
CALCIUM SERPL-MCNC: 8.8 MG/DL (ref 8.7–10.5)
CHLORIDE SERPL-SCNC: 104 MMOL/L (ref 95–110)
CO2 SERPL-SCNC: 31 MMOL/L (ref 23–29)
CREAT SERPL-MCNC: 0.8 MG/DL (ref 0.5–1.4)
DIFFERENTIAL METHOD: ABNORMAL
EOSINOPHIL # BLD AUTO: 0 K/UL (ref 0–0.5)
EOSINOPHIL NFR BLD: 0 % (ref 0–8)
ERYTHROCYTE [DISTWIDTH] IN BLOOD BY AUTOMATED COUNT: 11.9 % (ref 11.5–14.5)
EST. GFR  (AFRICAN AMERICAN): >60 ML/MIN/1.73 M^2
EST. GFR  (NON AFRICAN AMERICAN): >60 ML/MIN/1.73 M^2
GLUCOSE SERPL-MCNC: 118 MG/DL (ref 70–110)
HCT VFR BLD AUTO: 38.3 % (ref 37–48.5)
HGB BLD-MCNC: 12.5 G/DL (ref 12–16)
IMM GRANULOCYTES # BLD AUTO: 0.11 K/UL (ref 0–0.04)
IMM GRANULOCYTES NFR BLD AUTO: 0.9 % (ref 0–0.5)
LYMPHOCYTES # BLD AUTO: 1.4 K/UL (ref 1–4.8)
LYMPHOCYTES NFR BLD: 11.9 % (ref 18–48)
MCH RBC QN AUTO: 28.1 PG (ref 27–31)
MCHC RBC AUTO-ENTMCNC: 32.6 G/DL (ref 32–36)
MCV RBC AUTO: 86 FL (ref 82–98)
MONOCYTES # BLD AUTO: 0.8 K/UL (ref 0.3–1)
MONOCYTES NFR BLD: 6.4 % (ref 4–15)
NEUTROPHILS # BLD AUTO: 9.7 K/UL (ref 1.8–7.7)
NEUTROPHILS NFR BLD: 80.7 % (ref 38–73)
NRBC BLD-RTO: 0 /100 WBC
PLATELET # BLD AUTO: 318 K/UL (ref 150–350)
PMV BLD AUTO: 10.6 FL (ref 9.2–12.9)
POTASSIUM SERPL-SCNC: 3.5 MMOL/L (ref 3.5–5.1)
PROT SERPL-MCNC: 6.9 G/DL (ref 6–8.4)
RBC # BLD AUTO: 4.45 M/UL (ref 4–5.4)
SODIUM SERPL-SCNC: 140 MMOL/L (ref 136–145)
WBC # BLD AUTO: 11.98 K/UL (ref 3.9–12.7)

## 2020-12-24 PROCEDURE — 63600175 PHARM REV CODE 636 W HCPCS: Performed by: SURGERY

## 2020-12-24 PROCEDURE — 25000003 PHARM REV CODE 250: Performed by: INTERNAL MEDICINE

## 2020-12-24 PROCEDURE — 36415 COLL VENOUS BLD VENIPUNCTURE: CPT

## 2020-12-24 PROCEDURE — 25000003 PHARM REV CODE 250: Performed by: SURGERY

## 2020-12-24 PROCEDURE — 80053 COMPREHEN METABOLIC PANEL: CPT

## 2020-12-24 PROCEDURE — 85025 COMPLETE CBC W/AUTO DIFF WBC: CPT

## 2020-12-24 PROCEDURE — S0030 INJECTION, METRONIDAZOLE: HCPCS | Performed by: SURGERY

## 2020-12-24 PROCEDURE — 63600175 PHARM REV CODE 636 W HCPCS: Performed by: INTERNAL MEDICINE

## 2020-12-24 PROCEDURE — 11000001 HC ACUTE MED/SURG PRIVATE ROOM

## 2020-12-24 RX ORDER — AMLODIPINE BESYLATE 10 MG/1
10 TABLET ORAL DAILY
Status: DISCONTINUED | OUTPATIENT
Start: 2020-12-25 | End: 2020-12-25 | Stop reason: HOSPADM

## 2020-12-24 RX ADMIN — HYDROMORPHONE HYDROCHLORIDE 2 MG: 2 INJECTION, SOLUTION INTRAMUSCULAR; INTRAVENOUS; SUBCUTANEOUS at 09:12

## 2020-12-24 RX ADMIN — METHYLPREDNISOLONE SODIUM SUCCINATE 125 MG: 125 INJECTION, POWDER, FOR SOLUTION INTRAMUSCULAR; INTRAVENOUS at 06:12

## 2020-12-24 RX ADMIN — OXYCODONE HYDROCHLORIDE AND ACETAMINOPHEN 1 TABLET: 10; 325 TABLET ORAL at 08:12

## 2020-12-24 RX ADMIN — FAMOTIDINE 20 MG: 10 INJECTION INTRAVENOUS at 08:12

## 2020-12-24 RX ADMIN — METHYLPREDNISOLONE SODIUM SUCCINATE 125 MG: 125 INJECTION, POWDER, FOR SOLUTION INTRAMUSCULAR; INTRAVENOUS at 12:12

## 2020-12-24 RX ADMIN — METRONIDAZOLE 500 MG: 500 INJECTION, SOLUTION INTRAVENOUS at 09:12

## 2020-12-24 RX ADMIN — LISINOPRIL 20 MG: 20 TABLET ORAL at 08:12

## 2020-12-24 RX ADMIN — VANCOMYCIN HYDROCHLORIDE 125 MG: KIT at 12:12

## 2020-12-24 RX ADMIN — HYDROMORPHONE HYDROCHLORIDE 2 MG: 2 INJECTION, SOLUTION INTRAMUSCULAR; INTRAVENOUS; SUBCUTANEOUS at 02:12

## 2020-12-24 RX ADMIN — CIPROFLOXACIN 400 MG: 2 INJECTION, SOLUTION INTRAVENOUS at 01:12

## 2020-12-24 RX ADMIN — VANCOMYCIN HYDROCHLORIDE 125 MG: KIT at 06:12

## 2020-12-24 RX ADMIN — CIPROFLOXACIN 400 MG: 2 INJECTION, SOLUTION INTRAVENOUS at 02:12

## 2020-12-24 RX ADMIN — VANCOMYCIN HYDROCHLORIDE 125 MG: KIT at 11:12

## 2020-12-24 RX ADMIN — METRONIDAZOLE 500 MG: 500 INJECTION, SOLUTION INTRAVENOUS at 01:12

## 2020-12-24 RX ADMIN — ONDANSETRON HYDROCHLORIDE 4 MG: 2 SOLUTION INTRAMUSCULAR; INTRAVENOUS at 08:12

## 2020-12-24 RX ADMIN — SODIUM CHLORIDE 50 ML/HR: 0.9 INJECTION, SOLUTION INTRAVENOUS at 02:12

## 2020-12-24 RX ADMIN — AMLODIPINE BESYLATE 5 MG: 5 TABLET ORAL at 08:12

## 2020-12-24 RX ADMIN — METHYLPREDNISOLONE SODIUM SUCCINATE 125 MG: 125 INJECTION, POWDER, FOR SOLUTION INTRAMUSCULAR; INTRAVENOUS at 11:12

## 2020-12-24 RX ADMIN — METRONIDAZOLE 500 MG: 500 INJECTION, SOLUTION INTRAVENOUS at 06:12

## 2020-12-24 NOTE — ASSESSMENT & PLAN NOTE
EGD reveals gastritis and undigested food.  Low residue.  No acidic foods   GB US negative except fatty liver

## 2020-12-24 NOTE — ASSESSMENT & PLAN NOTE
Colonoscopy unrevealing. Poor prep ? Constipation/ ileus  Antibiotics for infectious enteritis  Would check IBD panel for completeness

## 2020-12-24 NOTE — PLAN OF CARE
12/24/20 0753   Discharge Reassessment   Assessment Type Discharge Planning Reassessment   Anticipated Discharge Disposition Home   Discharge Plan A Home   Discharge Plan B Home   DME Needed Upon Discharge  none   Post-Acute Status   Post-Acute Authorization Other   Other Status No Post-Acute Service Needs     Continue dc plan to dc home.  No anticipated post acute set-up required.

## 2020-12-24 NOTE — PROGRESS NOTES
[facility] Surg  General Surgery  Progress Note  12/23/20    Subjective:     History of Present Illness:  Admitted for abdominal pain.  Reports pain is achy and sharp at the same time.  Reports it has been ongoing for at least a year.  GI treated her with Miralax for constipation.  No endoscopy in the past.  Some diarrhea and constipation.  Stool dark occasionally.  No BRBPR.  Nausea and vomiting even with liquids.      Post-Op Info:  Procedure(s) (LRB):  EGD, WITH CLOSED BIOPSY (N/A)  COLONOSCOPY (N/A)   1 Day Post-Op     Interval History: Sitting comfortably in bed until questioned and reports excruciating pain.  Decreased BM.  Nausea and burning abdominal pain but no vomiting.      Medications:  Continuous Infusions:   sodium chloride 0.9% 50 mL/hr at 12/23/20 1629     Scheduled Meds:   amLODIPine  5 mg Oral Daily    ciprofloxacin (CIPRO)400mg/200ml D5W IVPB  400 mg Intravenous Q12H    famotidine (PF)  20 mg Intravenous Q12H    [START ON 12/24/2020] lisinopriL  20 mg Oral Daily    methylPREDNISolone sodium succinate  125 mg Intravenous Q6H    metronidazole  500 mg Intravenous Q8H    vancomycin  125 mg Oral Q6H     PRN Meds:HYDROmorphone, melatonin, ondansetron, oxyCODONE-acetaminophen, promethazine (PHENERGAN) IVPB, sodium chloride 0.9%     Review of patient's allergies indicates:   Allergen Reactions    Pcn [penicillins] Hives     Objective:     Vital Signs (Most Recent):  Temp: 98.8 °F (37.1 °C) (12/23/20 2051)  Pulse: (!) 57 (12/23/20 2238)  Resp: (!) 22 (12/23/20 2124)  BP: (!) 208/92 (12/23/20 2238)  SpO2: 99 % (12/23/20 2051) Vital Signs (24h Range):  Temp:  [97.9 °F (36.6 °C)-99.3 °F (37.4 °C)] 98.8 °F (37.1 °C)  Pulse:  [57-73] 57  Resp:  [20-22] 22  SpO2:  [95 %-100 %] 99 %  BP: (169-211)/() 208/92     Weight: 74.2 kg (163 lb 8 oz)  Body mass index is 25.61 kg/m².    Intake/Output - Last 3 Shifts       12/21 0700 - 12/22 0659 12/22 0700 - 12/23 0659 12/23 0700 - 12/24 0659     P.O. 2240 1410 600    I.V. (mL/kg) 2865 (38.6) 3466 (46.7) 1000 (13.5)    IV Piggyback 400 400     Total Intake(mL/kg) 5505 (74.2) 5276 (71.1) 1600 (21.6)    Urine (mL/kg/hr) 2000 (1.1)      Stool 0      Total Output 2000      Net +3505 +5276 +1600           Urine Occurrence 3 x 11 x     Stool Occurrence 4 x 8 x           Physical Exam  Vitals signs and nursing note reviewed.   Constitutional:       General: She is not in acute distress.     Appearance: Normal appearance.   HENT:      Head: Normocephalic and atraumatic.      Mouth/Throat:      Mouth: Mucous membranes are moist.      Pharynx: Oropharynx is clear.   Eyes:      Extraocular Movements: Extraocular movements intact.      Pupils: Pupils are equal, round, and reactive to light.   Cardiovascular:      Rate and Rhythm: Normal rate and regular rhythm.      Heart sounds: Normal heart sounds. No murmur. No gallop.    Pulmonary:      Effort: Pulmonary effort is normal. No respiratory distress.      Breath sounds: Normal breath sounds. No wheezing.   Abdominal:      General: Abdomen is flat. Bowel sounds are normal. There is no distension.      Palpations: Abdomen is soft.      Tenderness: There is abdominal tenderness (mild diffuse). There is no guarding.   Skin:     General: Skin is warm and dry.   Neurological:      General: No focal deficit present.      Mental Status: She is oriented to person, place, and time. Mental status is at baseline.   Psychiatric:         Mood and Affect: Mood normal.         Behavior: Behavior normal.         Thought Content: Thought content normal.         Judgment: Judgment normal.         Significant Labs:  CBC:   Recent Labs   Lab 12/23/20  0400   WBC 12.74*   RBC 3.90*   HGB 11.1*   HCT 34.1*      MCV 87   MCH 28.5   MCHC 32.6     BMP:   Recent Labs   Lab 12/23/20  0400   *      K 3.7      CO2 25   BUN 13   CREATININE 0.8   CALCIUM 8.0*       Significant Diagnostics:  no new    Assessment/Plan:     *  Enteritis  Colonoscopy unrevealing. Poor prep ? Constipation/ ileus  Antibiotics for infectious enteritis  Would check IBD panel for completeness    Nausea and vomiting  EGD reveals gastritis and undigested food.  Low residue.  No acidic foods   GB US negative except fatty liver        Lottie Villavicencio MD  General Surgery  [facility]

## 2020-12-24 NOTE — PROGRESS NOTES
[facility] Hospital Medicine  Progress Note    Patient Name: Sri Carrington  MRN: [id number]  Patient Class: IP- Inpatient   Admission Date: 12/20/2020  Length of Stay: 1 days  Attending Physician: Foreign Klein Jr., MD  Primary Care Provider: [facility]        Subjective:     Principal Problem:Enteritis        HPI:  Chief Complaint   Patient presents with    Abdominal Pain       Diffuse abdominal pain x 3 days.     Nausea    Emesis    Diarrhea     She has an underlying history of irritable bowel syndrome, was found at one point to have mild bowel wall thickening on CT scan, possibly reflective of an enteritis, and has been considered possibly to have Crohn's disease.  She has not had a colonoscopy.  She has had chronic constipation and previous opiate abuse.  Smokes cigarettes.  Underlying history of thyroid disease, hypertension, gastroesophageal reflux.  Reports she has been followed by Gastroenterology in home a, is moving to [place] and transferring care there.  Continues to take medications as listed, is not on any specific medications for inflammatory bowel disease.  Does have any history of bleeding.  Here for exacerbation of gastrointestinal symptoms as she has had before, present for 2 or 3 days, diffuse abdominal pain with nausea and vomiting, no diarrhea or constipation.  No fever, urinary symptoms, back pain, chest pain, dyspnea, or other localizing complaints.  States she has had no pain medication of any kind recently.     The history is provided by the patient. No  was used.          Overview/Hospital Course:  12/22/2020:  Patient continues to endorse some abdominal discomfort.  There may be some slight improvement from yesterday but overall her upper abdominal and lower abdominal pain persists.  She has been seen by general surgery.  Plan for endoscopies today.    12/23/2020:  No acute events overnight.  The  patient's C. Difficile toxin has come back positive.  We will begin oral vancomycin.  Upper and lower endoscopies performed.    No new subjective & objective note has been filed under this hospital service since the last note was generated.    12/24/2020 : pieter nd examined, afebrile but having subjective chills and still having diarrhea    Review of Systems   Constitutional: Positive for fatigue. Negative for chills and fever.   HENT: Negative for rhinorrhea, sneezing and sore throat.    Eyes: Negative for pain and visual disturbance.   Respiratory: Negative for cough and shortness of breath.    Cardiovascular: Negative for chest pain.   Gastrointestinal: Positive for abdominal distention, abdominal pain, blood in stool, diarrhea, nausea and vomiting. Negative for constipation.   Endocrine: Negative for cold intolerance and heat intolerance.   Genitourinary: Negative for difficulty urinating.   Musculoskeletal: Negative for arthralgias and joint swelling.   Skin: Negative for rash.   Allergic/Immunologic: Negative for environmental allergies.   Neurological: Negative for dizziness, syncope and weakness.   Hematological: Does not bruise/bleed easily.   Psychiatric/Behavioral: Positive for sleep disturbance. Negative for dysphoric mood. The patient is not nervous/anxious.       Objective:      Vitals:    12/24/20 0340 12/24/20 0632 12/24/20 0847 12/24/20 1049   BP: (!) 154/69 (!) 188/87  (!) 172/79   BP Location: Right arm Left arm  Right arm   Patient Position: Lying Lying  Sitting   Pulse: 65 65  69   Resp: 12 18 (!) 24 20   Temp: 98.1 °F (36.7 °C) 98.4 °F (36.9 °C)  97.8 °F (36.6 °C)   TempSrc: Oral Oral  Oral   SpO2: 98% 95%  97%   Weight:       Height:           Weight: 74.2 kg (163 lb 8 oz)  Body mass index is 25.61 kg/m².     Intake/Output Summary (Last 24 hours) at 12/23/2020 1314  Last data filed at 12/23/2020 0500      Gross per 24 hour   Intake 5276 ml   Output --   Net 5276 ml      Physical Exam  Vitals  signs and nursing note reviewed.   Constitutional:       Appearance: Normal appearance. She is ill-appearing.   HENT:      Head: Normocephalic and atraumatic.      Nose: Nose normal.   Eyes:      Extraocular Movements: Extraocular movements intact.      Pupils: Pupils are equal, round, and reactive to light.   Neck:      Musculoskeletal: Normal range of motion and neck supple.   Cardiovascular:      Rate and Rhythm: Normal rate and regular rhythm.      Heart sounds: No murmur. No friction rub. No gallop.    Pulmonary:      Effort: Pulmonary effort is normal.      Breath sounds: Normal breath sounds.   Abdominal:      General: Abdomen is flat. Bowel sounds are normal.      Palpations: Abdomen is soft.      Tenderness: There is abdominal tenderness. There is guarding. There is no rebound.   Musculoskeletal:         General: No swelling or deformity.   Skin:     General: Skin is warm and dry.      Capillary Refill: Capillary refill takes less than 2 seconds.   Neurological:      General: No focal deficit present.      Mental Status: She is alert and oriented to person, place, and time.   Psychiatric:         Mood and Affect: Mood normal.         Behavior: Behavior normal.            Significant Labs: All pertinent labs within the past 24 hours have been reviewed.     Lab Results   Component Value Date    WBC 11.98 12/24/2020    HGB 12.5 12/24/2020    HCT 38.3 12/24/2020    MCV 86 12/24/2020     12/24/2020       Recent Labs   Lab 12/24/20  0602      K 3.5      CO2 31*   BUN 16   CREATININE 0.8   CALCIUM 8.8         Assessment/Plan:      * Enteritis  Infectious versus inflammatory versus other.  To undergo upper and lower endoscopy by general surgery.  We appreciate their input.    Medication includes: Ciprofloxacin and Flagyl.    C. Difficile screen positive.  We will add oral vancomycin 12/23/2020.    Colonoscopy reveals no mucosal abnormalities or evidence of enteritis.  Upper endoscopy revealed  some undigested food and inflammation in the body of the stomach.  Because abnormalities were detected.      Polysubstance abuse  Patient counseled on the health consequences associated with amphetamine and marijuana abuse she endorses understanding.      Nausea and vomiting  This is been intractable.  Continue antibiotics as needed.  Continue gentle IV fluids      VTE Risk Mitigation (From admission, onward)         Ordered     IP VTE LOW RISK PATIENT  Once      12/21/20 0052                Discharge Planning   JACKIE:      Code Status: Full Code   Is the patient medically ready for discharge?:     Reason for patient still in hospital (select all that apply): Treatment  Discharge Plan A: Home            Continue with oral vanc  Dc macey Rhodes MD  Department of Hospital Medicine   [facility]

## 2020-12-24 NOTE — SUBJECTIVE & OBJECTIVE
Interval History: Sitting comfortably in bed until questioned and reports excruciating pain.  Decreased BM.  Nausea and burning abdominal pain but no vomiting.      Medications:  Continuous Infusions:   sodium chloride 0.9% 50 mL/hr at 12/23/20 1629     Scheduled Meds:   amLODIPine  5 mg Oral Daily    ciprofloxacin (CIPRO)400mg/200ml D5W IVPB  400 mg Intravenous Q12H    famotidine (PF)  20 mg Intravenous Q12H    [START ON 12/24/2020] lisinopriL  20 mg Oral Daily    methylPREDNISolone sodium succinate  125 mg Intravenous Q6H    metronidazole  500 mg Intravenous Q8H    vancomycin  125 mg Oral Q6H     PRN Meds:HYDROmorphone, melatonin, ondansetron, oxyCODONE-acetaminophen, promethazine (PHENERGAN) IVPB, sodium chloride 0.9%     Review of patient's allergies indicates:   Allergen Reactions    Pcn [penicillins] Hives     Objective:     Vital Signs (Most Recent):  Temp: 98.8 °F (37.1 °C) (12/23/20 2051)  Pulse: (!) 57 (12/23/20 2238)  Resp: (!) 22 (12/23/20 2124)  BP: (!) 208/92 (12/23/20 2238)  SpO2: 99 % (12/23/20 2051) Vital Signs (24h Range):  Temp:  [97.9 °F (36.6 °C)-99.3 °F (37.4 °C)] 98.8 °F (37.1 °C)  Pulse:  [57-73] 57  Resp:  [20-22] 22  SpO2:  [95 %-100 %] 99 %  BP: (169-211)/() 208/92     Weight: 74.2 kg (163 lb 8 oz)  Body mass index is 25.61 kg/m².    Intake/Output - Last 3 Shifts       12/21 0700 - 12/22 0659 12/22 0700 - 12/23 0659 12/23 0700 - 12/24 0659    P.O. 2240 1410 600    I.V. (mL/kg) 2865 (38.6) 3466 (46.7) 1000 (13.5)    IV Piggyback 400 400     Total Intake(mL/kg) 5505 (74.2) 5276 (71.1) 1600 (21.6)    Urine (mL/kg/hr) 2000 (1.1)      Stool 0      Total Output 2000      Net +3505 +5276 +1600           Urine Occurrence 3 x 11 x     Stool Occurrence 4 x 8 x           Physical Exam  Vitals signs and nursing note reviewed.   Constitutional:       General: She is not in acute distress.     Appearance: Normal appearance.   HENT:      Head: Normocephalic and atraumatic.       Mouth/Throat:      Mouth: Mucous membranes are moist.      Pharynx: Oropharynx is clear.   Eyes:      Extraocular Movements: Extraocular movements intact.      Pupils: Pupils are equal, round, and reactive to light.   Cardiovascular:      Rate and Rhythm: Normal rate and regular rhythm.      Heart sounds: Normal heart sounds. No murmur. No gallop.    Pulmonary:      Effort: Pulmonary effort is normal. No respiratory distress.      Breath sounds: Normal breath sounds. No wheezing.   Abdominal:      General: Abdomen is flat. Bowel sounds are normal. There is no distension.      Palpations: Abdomen is soft.      Tenderness: There is abdominal tenderness (mild diffuse). There is no guarding.   Skin:     General: Skin is warm and dry.   Neurological:      General: No focal deficit present.      Mental Status: She is oriented to person, place, and time. Mental status is at baseline.   Psychiatric:         Mood and Affect: Mood normal.         Behavior: Behavior normal.         Thought Content: Thought content normal.         Judgment: Judgment normal.         Significant Labs:  CBC:   Recent Labs   Lab 12/23/20  0400   WBC 12.74*   RBC 3.90*   HGB 11.1*   HCT 34.1*      MCV 87   MCH 28.5   MCHC 32.6     BMP:   Recent Labs   Lab 12/23/20  0400   *      K 3.7      CO2 25   BUN 13   CREATININE 0.8   CALCIUM 8.0*       Significant Diagnostics:  no new

## 2020-12-25 VITALS
SYSTOLIC BLOOD PRESSURE: 184 MMHG | TEMPERATURE: 98 F | HEIGHT: 67 IN | RESPIRATION RATE: 22 BRPM | BODY MASS INDEX: 25.66 KG/M2 | WEIGHT: 163.5 LBS | OXYGEN SATURATION: 97 % | DIASTOLIC BLOOD PRESSURE: 90 MMHG | HEART RATE: 62 BPM

## 2020-12-25 PROBLEM — R11.2 NAUSEA AND VOMITING: Status: RESOLVED | Noted: 2020-12-21 | Resolved: 2020-12-25

## 2020-12-25 PROBLEM — K52.9 ENTERITIS: Status: RESOLVED | Noted: 2020-12-21 | Resolved: 2020-12-25

## 2020-12-25 LAB
ALBUMIN SERPL BCP-MCNC: 3.2 G/DL (ref 3.5–5.2)
ALP SERPL-CCNC: 50 U/L (ref 55–135)
ALT SERPL W/O P-5'-P-CCNC: 51 U/L (ref 10–44)
ANION GAP SERPL CALC-SCNC: 6 MMOL/L (ref 8–16)
AST SERPL-CCNC: 17 U/L (ref 10–40)
BASOPHILS # BLD AUTO: 0.02 K/UL (ref 0–0.2)
BASOPHILS NFR BLD: 0.2 % (ref 0–1.9)
BILIRUB SERPL-MCNC: 0.4 MG/DL (ref 0.1–1)
BUN SERPL-MCNC: 14 MG/DL (ref 6–20)
CALCIUM SERPL-MCNC: 8.5 MG/DL (ref 8.7–10.5)
CHLORIDE SERPL-SCNC: 101 MMOL/L (ref 95–110)
CO2 SERPL-SCNC: 31 MMOL/L (ref 23–29)
CREAT SERPL-MCNC: 0.9 MG/DL (ref 0.5–1.4)
DIFFERENTIAL METHOD: ABNORMAL
EOSINOPHIL # BLD AUTO: 0 K/UL (ref 0–0.5)
EOSINOPHIL NFR BLD: 0 % (ref 0–8)
ERYTHROCYTE [DISTWIDTH] IN BLOOD BY AUTOMATED COUNT: 11.6 % (ref 11.5–14.5)
EST. GFR  (AFRICAN AMERICAN): >60 ML/MIN/1.73 M^2
EST. GFR  (NON AFRICAN AMERICAN): >60 ML/MIN/1.73 M^2
GLUCOSE SERPL-MCNC: 119 MG/DL (ref 70–110)
HCT VFR BLD AUTO: 39.1 % (ref 37–48.5)
HGB BLD-MCNC: 12.9 G/DL (ref 12–16)
IMM GRANULOCYTES # BLD AUTO: 0.17 K/UL (ref 0–0.04)
IMM GRANULOCYTES NFR BLD AUTO: 1.5 % (ref 0–0.5)
LYMPHOCYTES # BLD AUTO: 1.4 K/UL (ref 1–4.8)
LYMPHOCYTES NFR BLD: 12.5 % (ref 18–48)
MCH RBC QN AUTO: 28.1 PG (ref 27–31)
MCHC RBC AUTO-ENTMCNC: 33 G/DL (ref 32–36)
MCV RBC AUTO: 85 FL (ref 82–98)
MONOCYTES # BLD AUTO: 0.4 K/UL (ref 0.3–1)
MONOCYTES NFR BLD: 3.9 % (ref 4–15)
NEUTROPHILS # BLD AUTO: 9 K/UL (ref 1.8–7.7)
NEUTROPHILS NFR BLD: 81.9 % (ref 38–73)
NRBC BLD-RTO: 0 /100 WBC
PLATELET # BLD AUTO: 346 K/UL (ref 150–350)
PMV BLD AUTO: 10.3 FL (ref 9.2–12.9)
POTASSIUM SERPL-SCNC: 3.2 MMOL/L (ref 3.5–5.1)
PROT SERPL-MCNC: 6.8 G/DL (ref 6–8.4)
RBC # BLD AUTO: 4.59 M/UL (ref 4–5.4)
SODIUM SERPL-SCNC: 138 MMOL/L (ref 136–145)
WBC # BLD AUTO: 11.02 K/UL (ref 3.9–12.7)

## 2020-12-25 PROCEDURE — S0030 INJECTION, METRONIDAZOLE: HCPCS | Performed by: SURGERY

## 2020-12-25 PROCEDURE — 36415 COLL VENOUS BLD VENIPUNCTURE: CPT

## 2020-12-25 PROCEDURE — 63600175 PHARM REV CODE 636 W HCPCS: Performed by: INTERNAL MEDICINE

## 2020-12-25 PROCEDURE — 25000003 PHARM REV CODE 250: Performed by: INTERNAL MEDICINE

## 2020-12-25 PROCEDURE — 80053 COMPREHEN METABOLIC PANEL: CPT

## 2020-12-25 PROCEDURE — 85025 COMPLETE CBC W/AUTO DIFF WBC: CPT

## 2020-12-25 PROCEDURE — 63600175 PHARM REV CODE 636 W HCPCS: Performed by: SURGERY

## 2020-12-25 PROCEDURE — 25000003 PHARM REV CODE 250: Performed by: SURGERY

## 2020-12-25 RX ORDER — IBUPROFEN 400 MG/1
400 TABLET ORAL EVERY 4 HOURS
Qty: 30 TABLET | Refills: 0 | Status: SHIPPED | OUTPATIENT
Start: 2020-12-25

## 2020-12-25 RX ORDER — AMLODIPINE BESYLATE 10 MG/1
10 TABLET ORAL DAILY
Qty: 30 TABLET | Refills: 11 | Status: SHIPPED | OUTPATIENT
Start: 2020-12-26 | End: 2022-08-29 | Stop reason: SDUPTHER

## 2020-12-25 RX ADMIN — VANCOMYCIN HYDROCHLORIDE 125 MG: KIT at 12:12

## 2020-12-25 RX ADMIN — CIPROFLOXACIN 400 MG: 2 INJECTION, SOLUTION INTRAVENOUS at 12:12

## 2020-12-25 RX ADMIN — FAMOTIDINE 20 MG: 10 INJECTION INTRAVENOUS at 08:12

## 2020-12-25 RX ADMIN — METHYLPREDNISOLONE SODIUM SUCCINATE 125 MG: 125 INJECTION, POWDER, FOR SOLUTION INTRAMUSCULAR; INTRAVENOUS at 12:12

## 2020-12-25 RX ADMIN — METHYLPREDNISOLONE SODIUM SUCCINATE 125 MG: 125 INJECTION, POWDER, FOR SOLUTION INTRAMUSCULAR; INTRAVENOUS at 06:12

## 2020-12-25 RX ADMIN — AMLODIPINE BESYLATE 10 MG: 10 TABLET ORAL at 08:12

## 2020-12-25 RX ADMIN — HYDROMORPHONE HYDROCHLORIDE 2 MG: 2 INJECTION, SOLUTION INTRAMUSCULAR; INTRAVENOUS; SUBCUTANEOUS at 08:12

## 2020-12-25 RX ADMIN — METRONIDAZOLE 500 MG: 500 INJECTION, SOLUTION INTRAVENOUS at 12:12

## 2020-12-25 RX ADMIN — VANCOMYCIN HYDROCHLORIDE 125 MG: KIT at 06:12

## 2020-12-25 RX ADMIN — LISINOPRIL 20 MG: 20 TABLET ORAL at 08:12

## 2020-12-25 RX ADMIN — METRONIDAZOLE 500 MG: 500 INJECTION, SOLUTION INTRAVENOUS at 08:12

## 2020-12-25 NOTE — NURSING
Iv removed intact with out edema or redness noted, instructed on c-diff, patient has not had any lose stool since admit except for what she had with bowel prep, instruction on medication what was taken and had to be taken and to monitor for any side effect of any new medications, discharged home with boy friend

## 2020-12-25 NOTE — DISCHARGE SUMMARY
[facility] Medicine  Discharge Summary      Patient Name: Sri Carrington  MRN: [id number]  Admission Date: 12/20/2020  Hospital Length of Stay: 2 days  Discharge Date and Time:  12/25/2020 11:37 AM  Attending Physician: Foreign Klein Jr., MD   Discharging Provider: Hardik Rhodes MD  Primary Care Provider: [facility] Course:   Chief Complaint   Patient presents with    Abdominal Pain       Diffuse abdominal pain x 3 days.     Nausea    Emesis    Diarrhea      She has an underlying history of irritable bowel syndrome, was found at one point to have mild bowel wall thickening on CT scan, possibly reflective of an enteritis, and has been considered possibly to have Crohn's disease.  She has not had a colonoscopy.  She has had chronic constipation and previous opiate abuse.  Smokes cigarettes.  Underlying history of thyroid disease, hypertension, gastroesophageal reflux.  Reports she has been followed by Gastroenterology in home a, is moving to [place] and transferring care there.  Continues to take medications as listed, is not on any specific medications for inflammatory bowel disease.  Does have any history of bleeding.  Here for exacerbation of gastrointestinal symptoms as she has had before, present for 2 or 3 days, diffuse abdominal pain with nausea and vomiting, no diarrhea or constipation.  No fever, urinary symptoms, back pain, chest pain, dyspnea, or other localizing complaints.  States she has had no pain medication of any kind recently.     The history is provided by the patient. No  was used.            Overview/Hospital Course:  12/22/2020:  Patient continues to endorse some abdominal discomfort.  There may be some slight improvement from yesterday but overall her upper abdominal and lower abdominal pain persists.  She has been seen by general surgery.  Plan for endoscopies  today.     12/23/2020:  No acute events overnight.  The patient's C. Difficile toxin has come back positive.  We will begin oral vancomycin.  Upper and lower endoscopies performed.     No new subjective & objective note has been filed under this hospital service since the last note was generated.     12/24/2020 : pieter nd examined, afebrile but having subjective chills and still having diarrhea     Review of Systems   Constitutional: Positive for fatigue. Negative for chills and fever.   HENT: Negative for rhinorrhea, sneezing and sore throat.    Eyes: Negative for pain and visual disturbance.   Respiratory: Negative for cough and shortness of breath.    Cardiovascular: Negative for chest pain.   Gastrointestinal: Positive for abdominal distention, abdominal pain, blood in stool, diarrhea, nausea and vomiting. Negative for constipation.   Endocrine: Negative for cold intolerance and heat intolerance.   Genitourinary: Negative for difficulty urinating.   Musculoskeletal: Negative for arthralgias and joint swelling.   Skin: Negative for rash.   Allergic/Immunologic: Negative for environmental allergies.   Neurological: Negative for dizziness, syncope and weakness.   Hematological: Does not bruise/bleed easily.   Psychiatric/Behavioral: Positive for sleep disturbance. Negative for dysphoric mood. The patient is not nervous/anxious.       Objective:      Vitals          Vitals:     12/24/20 0340 12/24/20 0632 12/24/20 0847 12/24/20 1049   BP: (!) 154/69 (!) 188/87   (!) 172/79   BP Location: Right arm Left arm   Right arm   Patient Position: Lying Lying   Sitting   Pulse: 65 65   69   Resp: 12 18 (!) 24 20   Temp: 98.1 °F (36.7 °C) 98.4 °F (36.9 °C)   97.8 °F (36.6 °C)   TempSrc: Oral Oral   Oral   SpO2: 98% 95%   97%   Weight:           Height:                   Weight: 74.2 kg (163 lb 8 oz)  Body mass index is 25.61 kg/m².     Intake/Output Summary (Last 24 hours) at 12/23/2020 1314  Last data filed at 12/23/2020  0500        Gross per 24 hour   Intake 5276 ml   Output --   Net 5276 ml      Physical Exam  Vitals signs and nursing note reviewed.   Constitutional:       Appearance: Normal appearance. She is ill-appearing.   HENT:      Head: Normocephalic and atraumatic.      Nose: Nose normal.   Eyes:      Extraocular Movements: Extraocular movements intact.      Pupils: Pupils are equal, round, and reactive to light.   Neck:      Musculoskeletal: Normal range of motion and neck supple.   Cardiovascular:      Rate and Rhythm: Normal rate and regular rhythm.      Heart sounds: No murmur. No friction rub. No gallop.    Pulmonary:      Effort: Pulmonary effort is normal.      Breath sounds: Normal breath sounds.   Abdominal:      General: Abdomen is flat. Bowel sounds are normal.      Palpations: Abdomen is soft.      Tenderness: There is abdominal tenderness. There is guarding. There is no rebound.   Musculoskeletal:         General: No swelling or deformity.   Skin:     General: Skin is warm and dry.      Capillary Refill: Capillary refill takes less than 2 seconds.   Neurological:      General: No focal deficit present.      Mental Status: She is alert and oriented to person, place, and time.   Psychiatric:         Mood and Affect: Mood normal.         Behavior: Behavior normal.            Significant Labs: All pertinent labs within the past 24 hours have been reviewed.           Lab Results   Component Value Date     WBC 11.98 12/24/2020     HGB 12.5 12/24/2020     HCT 38.3 12/24/2020     MCV 86 12/24/2020      12/24/2020             Recent Labs   Lab 12/24/20  0602      K 3.5      CO2 31*   BUN 16   CREATININE 0.8   CALCIUM 8.8            Assessment/Plan:      * Enteritis  Infectious versus inflammatory versus other.  To undergo upper and lower endoscopy by general surgery.  We appreciate their input.     Medication includes: Ciprofloxacin and Flagyl.     C. Difficile screen positive.  We will add oral  vancomycin 12/23/2020.     Colonoscopy reveals no mucosal abnormalities or evidence of enteritis.  Upper endoscopy revealed some undigested food and inflammation in the body of the stomach.  Because abnormalities were detected.        Polysubstance abuse  Patient counseled on the health consequences associated with amphetamine and marijuana abuse she endorses understanding.        Nausea and vomiting  This is been intractable.  Continue antibiotics as needed.  Continue gentle IV fluids            VTE Risk Mitigation (From admission, onward)                  Ordered        IP VTE LOW RISK PATIENT  Once      12/21/20 0052                     Discharge Planning   JACKIE:      Code Status: Full Code   Is the patient medically ready for discharge?:     Reason for patient still in hospital (select all that apply): Treatment  Discharge Plan A: Home            Symptoms improving  Dc home on po vanc          Consults:   Consults (From admission, onward)        Status Ordering Provider     Inpatient consult to General Surgery  Once     Provider:  (Not yet assigned)    GEORGE Ruffin JR     Inpatient consult to Internal Medicine  Once     Provider:  George Klein Jr., MD    Acknowledged DEBRA SMITH          Final Active Diagnoses:    Diagnosis Date Noted POA    Polysubstance abuse [F19.10] 12/21/2020 Yes      Problems Resolved During this Admission:    Diagnosis Date Noted Date Resolved POA    PRINCIPAL PROBLEM:  Enteritis [K52.9] 12/21/2020 12/25/2020 Yes    Nausea and vomiting [R11.2] 12/21/2020 12/25/2020 Yes      Discharged Condition: good    Disposition: Home or Self Care    Follow Up:  Follow-up Information     George Klein Jr, MD In 1 week.    Specialty: Internal Medicine  Contact information:  [address] [place] [id number]  [phone]                 Patient Instructions:      Diet Adult Regular     Activity as tolerated      Medications:  Reconciled Home Medications:      Medication List      START taking these medications    amLODIPine 10 MG tablet  Commonly known as: NORVASC  Take 1 tablet (10 mg total) by mouth once daily.  Start taking on: December 26, 2020     ibuprofen 400 MG tablet  Commonly known as: ADVIL,MOTRIN  Take 1 tablet (400 mg total) by mouth every 4 (four) hours.     vancomycin 25 mg/mL Soln  Take 5 mLs (125 mg total) by mouth every 6 (six) hours. for 56 doses        CONTINUE taking these medications    CAMRESE 0.15 mg-30 mcg (84)/10 mcg (7) 3mpk  Generic drug: L norgest/e.estradioL-e.estrad  TAKE ONE TABLET BY MOUTH ONCE A DAY. THANK YOU     dicyclomine 10 MG capsule  Commonly known as: BENTYL  TAKE TWO CAPSULES BY MOUTH EVERY 6 HOURS AS NEEDED FOR abdominal cramps THANK YOU     levothyroxine 75 MCG tablet  Commonly known as: SYNTHROID  Take 75 mcg by mouth before breakfast.     lisinopriL 40 MG tablet  Commonly known as: PRINIVIL,ZESTRIL  Take 40 mg by mouth once daily.     methocarbamoL 500 MG Tab  Commonly known as: ROBAXIN  TAKE TWO TABLETS BY MOUTH THREE TIMES DAILY AS NEEDED FOR MUSCLE SPASMS. THANK YOU     omeprazole 40 MG capsule  Commonly known as: PRILOSEC  TAKE ONE CAPSULE BY MOUTH ONCE A DAY FOR stomach THANK YOU     PROTONIX 40 mg suspension  Generic drug: pantoprazole  Take 40 mg by mouth once daily.     triamcinolone acetonide 0.1% 0.1 % cream  Commonly known as: KENALOG  Apply topical TWICE A DAY THANK YOU            Significant Diagnostic Studies: Labs: All labs within the past 24 hours have been reviewed    Pending Diagnostic Studies:     Procedure Component Value Units Date/Time    Specimen to Pathology, Surgery Gastrointestinal tract [[id number]] Collected: 12/22/20 1404    Order Status: Sent Lab Status: In process Updated: 12/22/20 1445        Indwelling Lines/Drains at time of discharge:   Lines/Drains/Airways     None                 Time spent on the discharge of patient: 35 minutes  Patient  was seen and examined on the date of discharge and determined to be suitable for discharge.         Hardik Rhodes MD  Department of Hospital Medicine  [facility]

## 2020-12-25 NOTE — NURSING
Patient c/o legs being tight and warm face flushed and knees red stated it from the reglan,  Pregnima notified and reglan discontinued and bendryl ordered, no sign of sob

## 2020-12-28 NOTE — PLAN OF CARE
12/28/20 0845   Final Note   Assessment Type Final Discharge Note   Anticipated Discharge Disposition Home   Post-Acute Status   Other Status No Post-Acute Service Needs     Patient discharged to home on 12/25/2020 w/no other services needed.

## 2020-12-29 LAB
GPP - ADENOVIRUS 40/41: NOT DETECTED
GPP - CAMPYLOBACTER: NOT DETECTED
GPP - CRYPTOSPORIDIUM: NOT DETECTED
GPP - E COLI O157: NOT DETECTED
GPP - ENTAMOEBA HISTOLYTICA: NOT DETECTED
GPP - ENTEROTOXIGENIC E COLI (ETEC): NOT DETECTED
GPP - GIARDIA LAMBLIA: NOT DETECTED
GPP - NOROVIRUS GI/GII: NOT DETECTED
GPP - ROTAVIRUS A: NOT DETECTED
GPP - SALMONELLA: NOT DETECTED
GPP - SHIGELLA: NOT DETECTED
GPP - VIBRIO CHOLERA: NOT DETECTED
GPP - YERSINIA ENTEROCOLITICA: NOT DETECTED
LACTATE PLASV-SCNC: NOT DETECTED MMOL/L

## 2020-12-30 LAB — SPECIMEN TO PATHOLOGY - SURGICAL: NORMAL

## 2021-02-19 ENCOUNTER — HOSPITAL ENCOUNTER (EMERGENCY)
Facility: HOSPITAL | Age: 36
Discharge: HOME | End: 2021-02-19
Attending: EMERGENCY MEDICINE
Payer: MEDICAID

## 2021-02-19 VITALS
HEIGHT: 67 IN | DIASTOLIC BLOOD PRESSURE: 86 MMHG | HEART RATE: 86 BPM | OXYGEN SATURATION: 99 % | SYSTOLIC BLOOD PRESSURE: 181 MMHG | RESPIRATION RATE: 20 BRPM | TEMPERATURE: 98 F | WEIGHT: 160 LBS | BODY MASS INDEX: 25.11 KG/M2

## 2021-02-19 DIAGNOSIS — N39.0 URINARY TRACT INFECTION WITHOUT HEMATURIA, SITE UNSPECIFIED: Primary | ICD-10-CM

## 2021-02-19 LAB
ALBUMIN SERPL BCP-MCNC: 3.9 G/DL (ref 3.5–5.2)
ALP SERPL-CCNC: 100 U/L (ref 55–135)
ALT SERPL W/O P-5'-P-CCNC: 38 U/L (ref 10–44)
ANION GAP SERPL CALC-SCNC: 3 MMOL/L (ref 8–16)
AST SERPL-CCNC: 31 U/L (ref 10–40)
B-HCG UR QL: NEGATIVE
BACTERIA #/AREA URNS HPF: ABNORMAL /HPF
BASOPHILS # BLD AUTO: 0.03 K/UL (ref 0–0.2)
BASOPHILS NFR BLD: 0.2 % (ref 0–1.9)
BILIRUB SERPL-MCNC: 0.4 MG/DL (ref 0.1–1)
BILIRUB UR QL STRIP: NEGATIVE
BUN SERPL-MCNC: 16 MG/DL (ref 6–20)
C TRACH DNA SPEC QL NAA+PROBE: NOT DETECTED
CALCIUM SERPL-MCNC: 9.4 MG/DL (ref 8.7–10.5)
CHLORIDE SERPL-SCNC: 107 MMOL/L (ref 95–110)
CLARITY UR: CLEAR
CO2 SERPL-SCNC: 30 MMOL/L (ref 23–29)
COLOR UR: YELLOW
CREAT SERPL-MCNC: 0.9 MG/DL (ref 0.5–1.4)
DIFFERENTIAL METHOD: ABNORMAL
EOSINOPHIL # BLD AUTO: 0 K/UL (ref 0–0.5)
EOSINOPHIL NFR BLD: 0.2 % (ref 0–8)
ERYTHROCYTE [DISTWIDTH] IN BLOOD BY AUTOMATED COUNT: 12.8 % (ref 11.5–14.5)
EST. GFR  (AFRICAN AMERICAN): >60 ML/MIN/1.73 M^2
EST. GFR  (NON AFRICAN AMERICAN): >60 ML/MIN/1.73 M^2
GLUCOSE SERPL-MCNC: 95 MG/DL (ref 70–110)
GLUCOSE UR QL STRIP: NEGATIVE
HCT VFR BLD AUTO: 45.1 % (ref 37–48.5)
HGB BLD-MCNC: 14.7 G/DL (ref 12–16)
HGB UR QL STRIP: NEGATIVE
HYALINE CASTS #/AREA URNS LPF: 0 /LPF
IMM GRANULOCYTES # BLD AUTO: 0.04 K/UL (ref 0–0.04)
IMM GRANULOCYTES NFR BLD AUTO: 0.3 % (ref 0–0.5)
KETONES UR QL STRIP: NEGATIVE
LEUKOCYTE ESTERASE UR QL STRIP: ABNORMAL
LIPASE SERPL-CCNC: 101 U/L (ref 23–300)
LYMPHOCYTES # BLD AUTO: 2.1 K/UL (ref 1–4.8)
LYMPHOCYTES NFR BLD: 17 % (ref 18–48)
MCH RBC QN AUTO: 28.5 PG (ref 27–31)
MCHC RBC AUTO-ENTMCNC: 32.6 G/DL (ref 32–36)
MCV RBC AUTO: 88 FL (ref 82–98)
MICROSCOPIC COMMENT: ABNORMAL
MONOCYTES # BLD AUTO: 0.6 K/UL (ref 0.3–1)
MONOCYTES NFR BLD: 4.7 % (ref 4–15)
N GONORRHOEA DNA SPEC QL NAA+PROBE: NOT DETECTED
NEUTROPHILS # BLD AUTO: 9.5 K/UL (ref 1.8–7.7)
NEUTROPHILS NFR BLD: 77.6 % (ref 38–73)
NITRITE UR QL STRIP: NEGATIVE
NRBC BLD-RTO: 0 /100 WBC
PH UR STRIP: 7 [PH] (ref 5–8)
PLATELET # BLD AUTO: 381 K/UL (ref 150–350)
PMV BLD AUTO: 9.7 FL (ref 9.2–12.9)
POTASSIUM SERPL-SCNC: 4.1 MMOL/L (ref 3.5–5.1)
PROT SERPL-MCNC: 8.2 G/DL (ref 6–8.4)
PROT UR QL STRIP: NEGATIVE
RBC # BLD AUTO: 5.15 M/UL (ref 4–5.4)
RBC #/AREA URNS HPF: 7 /HPF (ref 0–4)
SODIUM SERPL-SCNC: 140 MMOL/L (ref 136–145)
SP GR UR STRIP: 1.02 (ref 1–1.03)
SQUAMOUS #/AREA URNS HPF: 10 /HPF
URN SPEC COLLECT METH UR: ABNORMAL
UROBILINOGEN UR STRIP-ACNC: NEGATIVE EU/DL
WBC # BLD AUTO: 12.22 K/UL (ref 3.9–12.7)
WBC #/AREA URNS HPF: 16 /HPF (ref 0–5)

## 2021-02-19 PROCEDURE — 25000003 PHARM REV CODE 250: Performed by: CLINICAL NURSE SPECIALIST

## 2021-02-19 PROCEDURE — 63600175 PHARM REV CODE 636 W HCPCS: Performed by: CLINICAL NURSE SPECIALIST

## 2021-02-19 PROCEDURE — 99284 EMERGENCY DEPT VISIT MOD MDM: CPT | Mod: 25

## 2021-02-19 PROCEDURE — 96375 TX/PRO/DX INJ NEW DRUG ADDON: CPT

## 2021-02-19 PROCEDURE — 81025 URINE PREGNANCY TEST: CPT

## 2021-02-19 PROCEDURE — 83690 ASSAY OF LIPASE: CPT

## 2021-02-19 PROCEDURE — 87086 URINE CULTURE/COLONY COUNT: CPT

## 2021-02-19 PROCEDURE — 80053 COMPREHEN METABOLIC PANEL: CPT

## 2021-02-19 PROCEDURE — 87491 CHLMYD TRACH DNA AMP PROBE: CPT

## 2021-02-19 PROCEDURE — 96372 THER/PROPH/DIAG INJ SC/IM: CPT | Mod: 59

## 2021-02-19 PROCEDURE — 87591 N.GONORRHOEAE DNA AMP PROB: CPT

## 2021-02-19 PROCEDURE — 81000 URINALYSIS NONAUTO W/SCOPE: CPT

## 2021-02-19 PROCEDURE — 96365 THER/PROPH/DIAG IV INF INIT: CPT

## 2021-02-19 PROCEDURE — 85025 COMPLETE CBC W/AUTO DIFF WBC: CPT

## 2021-02-19 RX ORDER — PHENAZOPYRIDINE HYDROCHLORIDE 200 MG/1
200 TABLET, FILM COATED ORAL 3 TIMES DAILY
Qty: 30 TABLET | Refills: 0 | Status: SHIPPED | OUTPATIENT
Start: 2021-02-19 | End: 2021-02-19 | Stop reason: SDUPTHER

## 2021-02-19 RX ORDER — PROMETHAZINE HYDROCHLORIDE 25 MG/ML
12.5 INJECTION, SOLUTION INTRAMUSCULAR; INTRAVENOUS
Status: DISCONTINUED | OUTPATIENT
Start: 2021-02-19 | End: 2021-02-19

## 2021-02-19 RX ORDER — ONDANSETRON 4 MG/1
4 TABLET, FILM COATED ORAL EVERY 6 HOURS
Qty: 12 TABLET | Refills: 0 | Status: SHIPPED | OUTPATIENT
Start: 2021-02-19 | End: 2021-02-19 | Stop reason: SDUPTHER

## 2021-02-19 RX ORDER — PROCHLORPERAZINE EDISYLATE 5 MG/ML
10 INJECTION INTRAMUSCULAR; INTRAVENOUS ONCE
Status: COMPLETED | OUTPATIENT
Start: 2021-02-19 | End: 2021-02-19

## 2021-02-19 RX ORDER — ONDANSETRON 4 MG/1
4 TABLET, FILM COATED ORAL EVERY 6 HOURS
Qty: 12 TABLET | Refills: 0 | OUTPATIENT
Start: 2021-02-19 | End: 2022-09-13

## 2021-02-19 RX ORDER — NITROFURANTOIN 25; 75 MG/1; MG/1
100 CAPSULE ORAL 2 TIMES DAILY
Qty: 10 CAPSULE | Refills: 0 | Status: SHIPPED | OUTPATIENT
Start: 2021-02-19 | End: 2021-02-19 | Stop reason: SDUPTHER

## 2021-02-19 RX ORDER — DICYCLOMINE HYDROCHLORIDE 10 MG/ML
20 INJECTION INTRAMUSCULAR
Status: COMPLETED | OUTPATIENT
Start: 2021-02-19 | End: 2021-02-19

## 2021-02-19 RX ORDER — PHENAZOPYRIDINE HYDROCHLORIDE 200 MG/1
200 TABLET, FILM COATED ORAL 3 TIMES DAILY
Qty: 30 TABLET | Refills: 0 | Status: SHIPPED | OUTPATIENT
Start: 2021-02-19 | End: 2021-03-01

## 2021-02-19 RX ORDER — NITROFURANTOIN 25; 75 MG/1; MG/1
100 CAPSULE ORAL 2 TIMES DAILY
Qty: 10 CAPSULE | Refills: 0 | Status: SHIPPED | OUTPATIENT
Start: 2021-02-19 | End: 2021-02-24

## 2021-02-19 RX ADMIN — PROMETHAZINE HYDROCHLORIDE 25 MG: 25 INJECTION INTRAMUSCULAR; INTRAVENOUS at 02:02

## 2021-02-19 RX ADMIN — DICYCLOMINE HYDROCHLORIDE 20 MG: 20 INJECTION INTRAMUSCULAR at 12:02

## 2021-02-19 RX ADMIN — PROCHLORPERAZINE EDISYLATE 10 MG: 5 INJECTION INTRAMUSCULAR; INTRAVENOUS at 12:02

## 2021-02-21 LAB — BACTERIA UR CULT: NORMAL

## 2021-05-04 ENCOUNTER — PATIENT MESSAGE (OUTPATIENT)
Dept: RESEARCH | Facility: HOSPITAL | Age: 36
End: 2021-05-04

## 2021-05-10 ENCOUNTER — PATIENT MESSAGE (OUTPATIENT)
Dept: RESEARCH | Facility: HOSPITAL | Age: 36
End: 2021-05-10

## 2022-06-27 NOTE — ASSESSMENT & PLAN NOTE
Infectious versus inflammatory versus other.  To undergo upper and lower endoscopy by general surgery.  We appreciate their input.    Medication includes: Ciprofloxacin and Flagyl.    C. Difficile screen positive.  We will add oral vancomycin 12/23/2020.    Colonoscopy reveals no mucosal abnormalities or evidence of enteritis.  Upper endoscopy revealed some undigested food and inflammation in the body of the stomach.  Because abnormalities were detected.     1 cc/1% lidocaine

## 2022-08-29 ENCOUNTER — HOSPITAL ENCOUNTER (EMERGENCY)
Facility: HOSPITAL | Age: 37
Discharge: HOME | End: 2022-08-29
Attending: EMERGENCY MEDICINE
Payer: MEDICAID

## 2022-08-29 VITALS
OXYGEN SATURATION: 100 % | BODY MASS INDEX: 26.47 KG/M2 | SYSTOLIC BLOOD PRESSURE: 142 MMHG | TEMPERATURE: 98 F | HEART RATE: 74 BPM | HEIGHT: 68 IN | WEIGHT: 174.63 LBS | DIASTOLIC BLOOD PRESSURE: 71 MMHG | RESPIRATION RATE: 16 BRPM

## 2022-08-29 DIAGNOSIS — I10 HYPERTENSION, UNSPECIFIED TYPE: Primary | ICD-10-CM

## 2022-08-29 DIAGNOSIS — R51.9 ACUTE NONINTRACTABLE HEADACHE, UNSPECIFIED HEADACHE TYPE: ICD-10-CM

## 2022-08-29 LAB
ALBUMIN SERPL BCP-MCNC: 3.6 G/DL (ref 3.5–5.2)
ALP SERPL-CCNC: 80 U/L (ref 55–135)
ALT SERPL W/O P-5'-P-CCNC: 27 U/L (ref 10–44)
ANION GAP SERPL CALC-SCNC: 5 MMOL/L (ref 8–16)
AST SERPL-CCNC: 26 U/L (ref 10–40)
BILIRUB SERPL-MCNC: 0.5 MG/DL (ref 0.1–1)
BUN SERPL-MCNC: 18 MG/DL (ref 6–20)
CALCIUM SERPL-MCNC: 8.4 MG/DL (ref 8.7–10.5)
CHLORIDE SERPL-SCNC: 109 MMOL/L (ref 95–110)
CO2 SERPL-SCNC: 24 MMOL/L (ref 23–29)
CREAT SERPL-MCNC: 1.1 MG/DL (ref 0.5–1.4)
EST. GFR  (NO RACE VARIABLE): >60 ML/MIN/1.73 M^2
GLUCOSE SERPL-MCNC: 87 MG/DL (ref 70–110)
POTASSIUM SERPL-SCNC: 3.7 MMOL/L (ref 3.5–5.1)
PROT SERPL-MCNC: 7.6 G/DL (ref 6–8.4)
SODIUM SERPL-SCNC: 138 MMOL/L (ref 136–145)
T4 FREE SERPL-MCNC: 0.84 NG/DL (ref 0.71–1.51)
TSH SERPL DL<=0.005 MIU/L-ACNC: 26.8 UIU/ML (ref 0.4–4)

## 2022-08-29 PROCEDURE — 63600175 PHARM REV CODE 636 W HCPCS: Performed by: EMERGENCY MEDICINE

## 2022-08-29 PROCEDURE — 25000003 PHARM REV CODE 250: Performed by: EMERGENCY MEDICINE

## 2022-08-29 PROCEDURE — 84439 ASSAY OF FREE THYROXINE: CPT | Performed by: EMERGENCY MEDICINE

## 2022-08-29 PROCEDURE — 84443 ASSAY THYROID STIM HORMONE: CPT | Performed by: EMERGENCY MEDICINE

## 2022-08-29 PROCEDURE — 99284 EMERGENCY DEPT VISIT MOD MDM: CPT | Mod: 25

## 2022-08-29 PROCEDURE — 80053 COMPREHEN METABOLIC PANEL: CPT | Performed by: EMERGENCY MEDICINE

## 2022-08-29 PROCEDURE — 96372 THER/PROPH/DIAG INJ SC/IM: CPT | Performed by: EMERGENCY MEDICINE

## 2022-08-29 RX ORDER — CLONIDINE HYDROCHLORIDE 0.1 MG/1
0.1 TABLET ORAL
Status: COMPLETED | OUTPATIENT
Start: 2022-08-29 | End: 2022-08-29

## 2022-08-29 RX ORDER — AMLODIPINE BESYLATE 10 MG/1
10 TABLET ORAL
Status: COMPLETED | OUTPATIENT
Start: 2022-08-29 | End: 2022-08-29

## 2022-08-29 RX ORDER — LISINOPRIL 40 MG/1
40 TABLET ORAL DAILY
Qty: 30 TABLET | Refills: 0 | Status: SHIPPED | OUTPATIENT
Start: 2022-08-29

## 2022-08-29 RX ORDER — PROCHLORPERAZINE EDISYLATE 5 MG/ML
5 INJECTION INTRAMUSCULAR; INTRAVENOUS
Status: COMPLETED | OUTPATIENT
Start: 2022-08-29 | End: 2022-08-29

## 2022-08-29 RX ORDER — LISINOPRIL 20 MG/1
40 TABLET ORAL
Status: COMPLETED | OUTPATIENT
Start: 2022-08-29 | End: 2022-08-29

## 2022-08-29 RX ORDER — AMLODIPINE BESYLATE 10 MG/1
10 TABLET ORAL DAILY
Qty: 30 TABLET | Refills: 0 | Status: SHIPPED | OUTPATIENT
Start: 2022-08-29 | End: 2023-08-29

## 2022-08-29 RX ADMIN — LISINOPRIL 40 MG: 20 TABLET ORAL at 09:08

## 2022-08-29 RX ADMIN — PROCHLORPERAZINE EDISYLATE 5 MG: 5 INJECTION INTRAMUSCULAR; INTRAVENOUS at 09:08

## 2022-08-29 RX ADMIN — CLONIDINE HYDROCHLORIDE 0.1 MG: 0.1 TABLET ORAL at 09:08

## 2022-08-29 RX ADMIN — AMLODIPINE BESYLATE 10 MG: 10 TABLET ORAL at 09:08

## 2022-08-30 NOTE — ED PROVIDER NOTES
"Encounter Date: 8/29/2022       History     Chief Complaint   Patient presents with    Hypertension     Pt present to the ER complaining of hypertension. Pt states that her blood pressure has been high throughout the day, reports a history of hypertension but states that she no longer takes medication to regulate her bp. Pt states that she is going to schedule an appointment w/ her pcp to address the complaints but states that she want to be evaluated tonight "because she doesn't want to stroke out."     37 yo female with history of HTN reports taken off HTN meds about a year ago is here via POV with complaint of generalized gradual onset headache and associated elevated BP. No trauma. No fever. No known sick contacts. Similar to previous. Reports BPs trending up and plans to see PCP to resume HTN meds.     Review of patient's allergies indicates:   Allergen Reactions    Pcn [penicillins] Hives     Past Medical History:   Diagnosis Date    Chronic constipation     Crohn's colitis     GERD (gastroesophageal reflux disease)     Hypertension     IBS (irritable bowel syndrome)     Thyroid disease      Past Surgical History:   Procedure Laterality Date    BTL      COLONOSCOPY N/A 12/22/2020    Procedure: COLONOSCOPY;  Surgeon: Lottie Villavicencio MD;  Location: [facility];  Service: General;  Laterality: N/A;     No family history on file.  Social History     Tobacco Use    Smoking status: Every Day     Packs/day: 1.00     Types: Cigarettes    Smokeless tobacco: Never   Substance Use Topics    Alcohol use: Never    Drug use: Not Currently     Comment: Hx opiates-quit 2015     Review of Systems   Constitutional: Negative.    Respiratory: Negative.     Cardiovascular: Negative.    Gastrointestinal: Negative.    Neurological:  Positive for headaches.   All other systems reviewed and are negative.    Physical Exam     Initial Vitals [08/29/22 2126]   BP Pulse Resp Temp SpO2   (!) 240/123 80 18 98.5 °F (36.9 °C) 100 %      MAP  "      --         Physical Exam    Nursing note and vitals reviewed.  Constitutional: She appears well-developed and well-nourished. She is not diaphoretic. No distress.   HENT:   Head: Normocephalic and atraumatic.   Eyes: EOM are normal. Pupils are equal, round, and reactive to light.   Neck: Neck supple.   Normal range of motion.  Cardiovascular:  Normal rate, regular rhythm and intact distal pulses.           Pulmonary/Chest: Breath sounds normal. No respiratory distress. She has no wheezes. She has no rales.   Abdominal: Abdomen is soft. Bowel sounds are normal. She exhibits no distension. There is no abdominal tenderness. There is no rebound.   Musculoskeletal:         General: No tenderness or edema. Normal range of motion.      Cervical back: Normal range of motion and neck supple.     Neurological: She is alert and oriented to person, place, and time. She has normal strength and normal reflexes. GCS score is 15. GCS eye subscore is 4. GCS verbal subscore is 5. GCS motor subscore is 6.   Skin: Skin is warm and dry. Capillary refill takes less than 2 seconds. No rash noted.       ED Course   Procedures  Labs Reviewed   TSH - Abnormal; Notable for the following components:       Result Value    TSH 26.800 (*)     All other components within normal limits   COMPREHENSIVE METABOLIC PANEL - Abnormal; Notable for the following components:    Calcium 8.4 (*)     Anion Gap 5 (*)     All other components within normal limits   T4, FREE          Imaging Results    None          Medications   prochlorperazine injection Soln 5 mg (5 mg Intramuscular Given 8/29/22 2145)   cloNIDine tablet 0.1 mg (0.1 mg Oral Given 8/29/22 2145)   lisinopriL tablet 40 mg (40 mg Oral Given 8/29/22 2145)   amLODIPine tablet 10 mg (10 mg Oral Given 8/29/22 2145)     Medical Decision Making:   Clinical Tests:   Lab Tests: Ordered and Reviewed                    Clinical Impression:   Final diagnoses:  [I10] Hypertension, unspecified type  (Primary)  [R51.9] Acute nonintractable headache, unspecified headache type        ED Disposition Condition    Discharge Stable          ED Prescriptions       Medication Sig Dispense Start Date End Date Auth. Provider    amLODIPine (NORVASC) 10 MG tablet Take 1 tablet (10 mg total) by mouth once daily. 30 tablet 8/29/2022 8/29/2023 Brandon Zazueta MD    lisinopriL (PRINIVIL,ZESTRIL) 40 MG tablet Take 1 tablet (40 mg total) by mouth once daily. 30 tablet 8/29/2022 -- Brandon Zazueta MD          Follow-up Information       Follow up With Specialties Details Why Contact Info    Charlene Nugent MD Internal Medicine Schedule an appointment as soon as possible for a visit   [address] [address]  [phone]               Brandon Zazueta MD  08/29/22 3263

## 2022-08-31 ENCOUNTER — HOSPITAL ENCOUNTER (EMERGENCY)
Facility: HOSPITAL | Age: 37
Discharge: HOME | End: 2022-08-31
Attending: EMERGENCY MEDICINE
Payer: MEDICAID

## 2022-08-31 ENCOUNTER — NURSE TRIAGE (OUTPATIENT)
Dept: ADMINISTRATIVE | Facility: CLINIC | Age: 37
End: 2022-08-31
Payer: MEDICAID

## 2022-08-31 VITALS
WEIGHT: 176 LBS | HEART RATE: 60 BPM | HEIGHT: 68 IN | DIASTOLIC BLOOD PRESSURE: 86 MMHG | OXYGEN SATURATION: 100 % | BODY MASS INDEX: 26.67 KG/M2 | RESPIRATION RATE: 18 BRPM | TEMPERATURE: 99 F | SYSTOLIC BLOOD PRESSURE: 150 MMHG

## 2022-08-31 DIAGNOSIS — G44.209 TENSION-TYPE HEADACHE, NOT INTRACTABLE, UNSPECIFIED CHRONICITY PATTERN: ICD-10-CM

## 2022-08-31 DIAGNOSIS — I10 HYPERTENSION, UNSPECIFIED TYPE: Primary | ICD-10-CM

## 2022-08-31 LAB
ANION GAP SERPL CALC-SCNC: 5 MMOL/L (ref 8–16)
BACTERIA #/AREA URNS HPF: NEGATIVE /HPF
BASOPHILS # BLD AUTO: 0.06 K/UL (ref 0–0.2)
BASOPHILS NFR BLD: 0.4 % (ref 0–1.9)
BILIRUB UR QL STRIP: NEGATIVE
BUN SERPL-MCNC: 13 MG/DL (ref 6–20)
CALCIUM SERPL-MCNC: 9 MG/DL (ref 8.7–10.5)
CHLORIDE SERPL-SCNC: 108 MMOL/L (ref 95–110)
CLARITY UR: ABNORMAL
CO2 SERPL-SCNC: 26 MMOL/L (ref 23–29)
COLOR UR: YELLOW
CREAT SERPL-MCNC: 0.8 MG/DL (ref 0.5–1.4)
DIFFERENTIAL METHOD: ABNORMAL
EOSINOPHIL # BLD AUTO: 0.2 K/UL (ref 0–0.5)
EOSINOPHIL NFR BLD: 1.1 % (ref 0–8)
ERYTHROCYTE [DISTWIDTH] IN BLOOD BY AUTOMATED COUNT: 12.8 % (ref 11.5–14.5)
EST. GFR  (NO RACE VARIABLE): >60 ML/MIN/1.73 M^2
GLUCOSE SERPL-MCNC: 95 MG/DL (ref 70–110)
GLUCOSE UR QL STRIP: NEGATIVE
HCT VFR BLD AUTO: 43.9 % (ref 37–48.5)
HGB BLD-MCNC: 14.7 G/DL (ref 12–16)
HGB UR QL STRIP: NEGATIVE
HYALINE CASTS #/AREA URNS LPF: 0.8 /LPF
IMM GRANULOCYTES # BLD AUTO: 0.08 K/UL (ref 0–0.04)
IMM GRANULOCYTES NFR BLD AUTO: 0.6 % (ref 0–0.5)
KETONES UR QL STRIP: NEGATIVE
LEUKOCYTE ESTERASE UR QL STRIP: NEGATIVE
LIPASE SERPL-CCNC: 99 U/L (ref 23–300)
LYMPHOCYTES # BLD AUTO: 3.1 K/UL (ref 1–4.8)
LYMPHOCYTES NFR BLD: 21.9 % (ref 18–48)
MCH RBC QN AUTO: 29.3 PG (ref 27–31)
MCHC RBC AUTO-ENTMCNC: 33.5 G/DL (ref 32–36)
MCV RBC AUTO: 88 FL (ref 82–98)
MICROSCOPIC COMMENT: ABNORMAL
MONOCYTES # BLD AUTO: 0.9 K/UL (ref 0.3–1)
MONOCYTES NFR BLD: 6.3 % (ref 4–15)
NEUTROPHILS # BLD AUTO: 9.7 K/UL (ref 1.8–7.7)
NEUTROPHILS NFR BLD: 69.7 % (ref 38–73)
NITRITE UR QL STRIP: NEGATIVE
NRBC BLD-RTO: 0 /100 WBC
PH UR STRIP: 8 [PH] (ref 5–8)
PLATELET # BLD AUTO: 346 K/UL (ref 150–450)
PMV BLD AUTO: 9.5 FL (ref 9.2–12.9)
POTASSIUM SERPL-SCNC: 3.7 MMOL/L (ref 3.5–5.1)
PROT UR QL STRIP: NEGATIVE
RBC # BLD AUTO: 5.02 M/UL (ref 4–5.4)
RBC #/AREA URNS HPF: 5 /HPF (ref 0–4)
SODIUM SERPL-SCNC: 139 MMOL/L (ref 136–145)
SP GR UR STRIP: >=1.03 (ref 1–1.03)
SQUAMOUS #/AREA URNS HPF: 4 /HPF
URN SPEC COLLECT METH UR: ABNORMAL
UROBILINOGEN UR STRIP-ACNC: 1 EU/DL
WBC # BLD AUTO: 13.96 K/UL (ref 3.9–12.7)
WBC #/AREA URNS HPF: 2 /HPF (ref 0–5)

## 2022-08-31 PROCEDURE — 25000003 PHARM REV CODE 250: Performed by: EMERGENCY MEDICINE

## 2022-08-31 PROCEDURE — 99285 EMERGENCY DEPT VISIT HI MDM: CPT | Mod: 25

## 2022-08-31 PROCEDURE — 36415 COLL VENOUS BLD VENIPUNCTURE: CPT | Performed by: EMERGENCY MEDICINE

## 2022-08-31 PROCEDURE — 83690 ASSAY OF LIPASE: CPT | Performed by: EMERGENCY MEDICINE

## 2022-08-31 PROCEDURE — 80048 BASIC METABOLIC PNL TOTAL CA: CPT | Performed by: EMERGENCY MEDICINE

## 2022-08-31 PROCEDURE — 96374 THER/PROPH/DIAG INJ IV PUSH: CPT | Mod: 59

## 2022-08-31 PROCEDURE — 85025 COMPLETE CBC W/AUTO DIFF WBC: CPT | Performed by: EMERGENCY MEDICINE

## 2022-08-31 PROCEDURE — 63600175 PHARM REV CODE 636 W HCPCS: Performed by: EMERGENCY MEDICINE

## 2022-08-31 PROCEDURE — 25500020 PHARM REV CODE 255: Performed by: EMERGENCY MEDICINE

## 2022-08-31 PROCEDURE — 81000 URINALYSIS NONAUTO W/SCOPE: CPT | Performed by: EMERGENCY MEDICINE

## 2022-08-31 PROCEDURE — 96361 HYDRATE IV INFUSION ADD-ON: CPT

## 2022-08-31 PROCEDURE — 96375 TX/PRO/DX INJ NEW DRUG ADDON: CPT

## 2022-08-31 RX ORDER — LABETALOL HCL 20 MG/4 ML
20 SYRINGE (ML) INTRAVENOUS
Status: DISCONTINUED | OUTPATIENT
Start: 2022-08-31 | End: 2022-08-31 | Stop reason: HOSPADM

## 2022-08-31 RX ORDER — MORPHINE SULFATE 4 MG/ML
4 INJECTION, SOLUTION INTRAMUSCULAR; INTRAVENOUS
Status: COMPLETED | OUTPATIENT
Start: 2022-08-31 | End: 2022-08-31

## 2022-08-31 RX ORDER — FAMOTIDINE 20 MG/1
20 TABLET, FILM COATED ORAL 2 TIMES DAILY
Qty: 20 TABLET | Refills: 0 | Status: SHIPPED | OUTPATIENT
Start: 2022-08-31 | End: 2023-08-31

## 2022-08-31 RX ORDER — MAG HYDROX/ALUMINUM HYD/SIMETH 200-200-20
30 SUSPENSION, ORAL (FINAL DOSE FORM) ORAL ONCE
Status: COMPLETED | OUTPATIENT
Start: 2022-08-31 | End: 2022-08-31

## 2022-08-31 RX ORDER — KETOROLAC TROMETHAMINE 30 MG/ML
15 INJECTION, SOLUTION INTRAMUSCULAR; INTRAVENOUS
Status: COMPLETED | OUTPATIENT
Start: 2022-08-31 | End: 2022-08-31

## 2022-08-31 RX ORDER — LIDOCAINE HYDROCHLORIDE 20 MG/ML
15 SOLUTION OROPHARYNGEAL ONCE
Status: COMPLETED | OUTPATIENT
Start: 2022-08-31 | End: 2022-08-31

## 2022-08-31 RX ORDER — FAMOTIDINE 10 MG/ML
40 INJECTION INTRAVENOUS
Status: COMPLETED | OUTPATIENT
Start: 2022-08-31 | End: 2022-08-31

## 2022-08-31 RX ADMIN — SODIUM CHLORIDE 1000 ML: 0.9 INJECTION, SOLUTION INTRAVENOUS at 04:08

## 2022-08-31 RX ADMIN — IOHEXOL 100 ML: 350 INJECTION, SOLUTION INTRAVENOUS at 05:08

## 2022-08-31 RX ADMIN — MORPHINE SULFATE 4 MG: 4 INJECTION, SOLUTION INTRAMUSCULAR; INTRAVENOUS at 04:08

## 2022-08-31 RX ADMIN — LIDOCAINE HYDROCHLORIDE 15 ML: 20 SOLUTION ORAL; TOPICAL at 04:08

## 2022-08-31 RX ADMIN — ALUMINUM HYDROXIDE, MAGNESIUM HYDROXIDE, AND SIMETHICONE 30 ML: 200; 200; 20 SUSPENSION ORAL at 04:08

## 2022-08-31 RX ADMIN — KETOROLAC TROMETHAMINE 15 MG: 30 INJECTION, SOLUTION INTRAMUSCULAR at 04:08

## 2022-08-31 RX ADMIN — FAMOTIDINE 40 MG: 10 INJECTION, SOLUTION INTRAVENOUS at 04:08

## 2022-08-31 NOTE — TELEPHONE ENCOUNTER
Day 2 of taking new blood pressure. Medication started, amlodipine and lisinopril. States she is having blurred vision and stomach issues. and she knows her BP is very high. Has not measured it today as she is not home, but states she can tell. Has taken lisinopril before without any problems. She has never taken amlodipine before. Triage done- ED advised due vision changes and discomfort. Verb understanding. Instructed to call back for any further questions or concerns or worsening S/S.   Reason for Disposition   Systolic BP >= 160 OR Diastolic >= 100, and any cardiac or neurologic symptoms (e.g., chest pain, difficulty breathing, unsteady gait, blurred vision)    Additional Information   Negative: Sounds like a life-threatening emergency to the triager   Negative: Symptom is main concern (e.g., headache, chest pain)   Negative: Low blood pressure is main concern   Negative: Pregnant 20 or more weeks or postpartum (< 6 weeks after delivery) with new hand or face swelling   Negative: Pregnant 20 or more weeks or postpartum with Systolic BP >= 140 OR Diastolic >= 90    Protocols used: Blood Pressure - High-A-OH

## 2022-08-31 NOTE — ED PROVIDER NOTES
EMERGENCY DEPARTMENT HISTORY AND PHYSICAL EXAM          Date: 8/31/2022   Patient Name: Sri Carrington       History of Presenting Illness           Chief Complaint   Patient presents with    Hypertension     Pt stated that she has hx HTN - was taken off meds about a year ago. Few days ago pt began experiencing headaches and seen in ED - Rx Lisinopril and Amlodipine. Last night pt began experiencing back/abdominal pain, episodes of blurry vision, and numbness to right hand along with continuation of headache. Hypertensive during triage 219/104.         History Provided By: Patient    1613   Sri Carrington is a 36 y.o. female with PMHX of HTN who presents to the emergency department C/O abdominal pain.    Patient reports abdominal pain that began last night and is worse today.  Reports epigastric abdominal pain that radiates to back.  No nausea or vomiting.  Reports she has felt similar symptoms in the past but not this severe.  No fever.  He denies surgical history.    Patient was seen emergency department 2 days ago for hypertension and prescribed amlodipine.  She states that she discontinued it because she thinks it may be contributing to reduce her epigastric pain.      PCP: [facility]        No current facility-administered medications for this encounter.     Current Outpatient Medications   Medication Sig Dispense Refill    amLODIPine (NORVASC) 10 MG tablet Take 1 tablet (10 mg total) by mouth once daily. 30 tablet 0    CAMRESE 0.15 mg-30 mcg (84)/10 mcg (7) 3MPk TAKE ONE TABLET BY MOUTH ONCE A DAY. THANK YOU  3    dicyclomine (BENTYL) 10 MG capsule TAKE TWO CAPSULES BY MOUTH EVERY 6 HOURS AS NEEDED FOR abdominal cramps THANK YOU 20 capsule 0    famotidine (PEPCID) 20 MG tablet Take 1 tablet (20 mg total) by mouth 2 (two) times daily. 20 tablet 0    ibuprofen (ADVIL,MOTRIN) 400 MG tablet Take 1 tablet (400 mg total) by mouth every 4 (four) hours. 30 tablet  0    levothyroxine (SYNTHROID) 75 MCG tablet Take 75 mcg by mouth before breakfast.      lisinopriL (PRINIVIL,ZESTRIL) 40 MG tablet Take 1 tablet (40 mg total) by mouth once daily. 30 tablet 0    methocarbamol (ROBAXIN) 500 MG Tab TAKE TWO TABLETS BY MOUTH THREE TIMES DAILY AS NEEDED FOR MUSCLE SPASMS. THANK YOU  0    omeprazole (PRILOSEC) 40 MG capsule TAKE ONE CAPSULE BY MOUTH ONCE A DAY FOR stomach THANK YOU  1    ondansetron (ZOFRAN) 4 MG tablet Take 1 tablet (4 mg total) by mouth every 6 (six) hours. 12 tablet 0    pantoprazole (PROTONIX) 40 mg suspension Take 40 mg by mouth once daily.      triamcinolone acetonide 0.1% (KENALOG) 0.1 % cream Apply topical TWICE A DAY THANK YOU  0           Past History     Past Medical History:   Past Medical History:   Diagnosis Date    Chronic constipation     Crohn's colitis     GERD (gastroesophageal reflux disease)     Hypertension     IBS (irritable bowel syndrome)     Thyroid disease         Past Surgical History:   Past Surgical History:   Procedure Laterality Date    BTL      COLONOSCOPY N/A 12/22/2020    Procedure: COLONOSCOPY;  Surgeon: Lottie Villavicencio MD;  Location: [facility];  Service: General;  Laterality: N/A;        Family History:   No family history on file.     Social History:   Social History     Tobacco Use    Smoking status: Every Day     Packs/day: 1.00     Types: Cigarettes    Smokeless tobacco: Never   Substance Use Topics    Alcohol use: Never    Drug use: Not Currently     Comment: Hx opiates-quit 2015        Allergies:   Review of patient's allergies indicates:   Allergen Reactions    Pcn [penicillins] Hives          Review of Systems   Review of Systems   Constitutional:  Negative for chills and fever.   Respiratory:  Negative for cough and shortness of breath.    Cardiovascular:  Negative for chest pain.   Gastrointestinal:  Positive for abdominal pain and constipation. Negative for anal bleeding, diarrhea, nausea and vomiting.   Genitourinary:   Negative for difficulty urinating and dysuria.   All other systems reviewed and are negative.             Physical Exam     Vitals:    08/31/22 1635 08/31/22 1724 08/31/22 1753 08/31/22 1836   BP: (!) 195/90 (!) 202/101 (!) 158/68 (!) 150/86   Pulse: 80 86 62 60   Resp: 18 18 18 18   Temp:       SpO2: 100% 100% 100% 100%   Weight:       Height:          Physical Exam  Vitals and nursing note reviewed.   Constitutional:       General: She is in acute distress (crying).      Appearance: She is not ill-appearing.   HENT:      Head: Normocephalic and atraumatic.      Right Ear: External ear normal.      Left Ear: External ear normal.      Nose: Nose normal. No congestion or rhinorrhea.      Mouth/Throat:      Mouth: Mucous membranes are moist.   Eyes:      Conjunctiva/sclera: Conjunctivae normal.      Pupils: Pupils are equal, round, and reactive to light.   Cardiovascular:      Rate and Rhythm: Normal rate and regular rhythm.      Pulses: Normal pulses.      Heart sounds: Normal heart sounds.   Pulmonary:      Effort: Pulmonary effort is normal. No respiratory distress.   Abdominal:      Palpations: Abdomen is soft.      Tenderness: There is abdominal tenderness in the epigastric area. There is no right CVA tenderness, left CVA tenderness, guarding or rebound.   Musculoskeletal:         General: No deformity. Normal range of motion.      Cervical back: Normal range of motion. No rigidity.   Skin:     General: Skin is dry.   Neurological:      General: No focal deficit present.      Mental Status: She is alert and oriented to person, place, and time. Mental status is at baseline.   Psychiatric:         Mood and Affect: Mood normal. Affect is tearful.         Behavior: Behavior normal.            Diagnostic Study Results      Labs -   No results found for this or any previous visit (from the past 12 hour(s)).       Radiologic Studies -    CT Abdomen Pelvis With Contrast   Final Result      1. No detected interval acute  CT change since the prior study.   2. Similar appearance of numerous markedly edematous small bowel loops within the left hemiabdomen and central abdomen.  No developed evidence for bowel obstruction, pneumatosis, pneumoperitoneum, or portal venous gas.   3. Persistence of mild pelvic free fluid which is nonspecific and may be physiologic or reactive.         Electronically signed by: Emerson Mart MD   Date:    08/31/2022   Time:    18:21           Medications given in the ED-   Medications   ketorolac injection 15 mg (15 mg Intravenous Given 8/31/22 1619)   sodium chloride 0.9% bolus 1,000 mL (0 mLs Intravenous Stopped 8/31/22 1723)   aluminum-magnesium hydroxide-simethicone 200-200-20 mg/5 mL suspension 30 mL (30 mLs Oral Given 8/31/22 1619)     And   LIDOcaine HCl 2% oral solution 15 mL (15 mLs Oral Given 8/31/22 1619)   famotidine (PF) injection 40 mg (40 mg Intravenous Given 8/31/22 1619)   morphine injection 4 mg (4 mg Intravenous Given 8/31/22 1620)   iohexoL (OMNIPAQUE 350) injection 100 mL (100 mLs Intravenous Given 8/31/22 1700)           Medical Decision Making    I am the first provider for this patient.     I reviewed the vital signs, available nursing notes, past medical history, past surgical history, family history and social history.     Vital Signs:  Reviewed the patient's vital signs.     Pulse Oximetry Analysis and Interpretation:    100% on Room Air, normal      Records Reviewed: Old medical records.  Nursing notes.        Provider Notes (Medical Decision Making): Sri Carrnigton is a 36 y.o. female with abdominal pain and hypertension       Procedures:   Procedures      ED Course:    Reviewed labs.  Mild leukocytosis of 13.96.  Chemistry unremarkable.  Creatinine within normal limits.  Urinalysis with increased specific gravity without infectious findings.  Patient reports resolution of pain symptoms.  Blood pressure improved with pain control.  CT abdomen pelvis negative for acute  abdominal findings.  Advised treatment for gastritis/GERD.  Close follow-up with primary care provider for re-evaluation GI symptoms as well as management of primary hypertension.  Reasons to return to ER discussed           Diagnosis and Disposition     Critical Care:      DISCHARGE NOTE:       Sri Carrington's  results have been reviewed with her.  She has been counseled regarding her diagnosis, treatment, and plan.  She verbally conveys understanding and agreement of the signs, symptoms, diagnosis, treatment and prognosis and additionally agrees to follow up as discussed.  She also agrees with the care-plan and conveys that all of her questions have been answered.  I have also provided discharge instructions for her that include: educational information regarding their diagnosis and treatment, and list of reasons why they would want to return to the ED prior to their follow-up appointment, should her condition change. She has been provided with education for proper emergency department utilization.         CLINICAL IMPRESSION:         1. Hypertension, unspecified type    2. Tension-type headache, not intractable, unspecified chronicity pattern              PLAN:   1. Discharge Home  2.      Medication List        START taking these medications      famotidine 20 MG tablet  Commonly known as: PEPCID  Take 1 tablet (20 mg total) by mouth 2 (two) times daily.            ASK your doctor about these medications      amLODIPine 10 MG tablet  Commonly known as: NORVASC  Take 1 tablet (10 mg total) by mouth once daily.     CAMRESE 0.15 mg-30 mcg (84)/10 mcg (7) 3mpk  Generic drug: L norgest/e.estradioL-e.estrad     dicyclomine 10 MG capsule  Commonly known as: BENTYL  TAKE TWO CAPSULES BY MOUTH EVERY 6 HOURS AS NEEDED FOR abdominal cramps THANK YOU     ibuprofen 400 MG tablet  Commonly known as: ADVIL,MOTRIN  Take 1 tablet (400 mg total) by mouth every 4 (four) hours.     levothyroxine 75 MCG tablet  Commonly  known as: SYNTHROID     lisinopriL 40 MG tablet  Commonly known as: PRINIVIL,ZESTRIL  Take 1 tablet (40 mg total) by mouth once daily.     methocarbamoL 500 MG Tab  Commonly known as: ROBAXIN     omeprazole 40 MG capsule  Commonly known as: PRILOSEC     ondansetron 4 MG tablet  Commonly known as: ZOFRAN  Take 1 tablet (4 mg total) by mouth every 6 (six) hours.     PROTONIX 40 mg suspension  Generic drug: pantoprazole     triamcinolone acetonide 0.1% 0.1 % cream  Commonly known as: KENALOG               Where to Get Your Medications        These medications were sent to [facility] [address]  [address], [facility] [address]      Phone: [phone]   famotidine 20 MG tablet        3. [facility]  [address] [facility] [address] [phone]    In 2 days       _______________________________     Please note that this dictation was completed with [employer], the computer voice recognition software.  Quite often unanticipated grammatical, syntax, homophones, and other interpretive errors are inadvertently transcribed by the computer software.  Please disregard these errors.  Please excuse any errors that have escaped final proofreading.             Jovany Ray MD  09/01/22 9092

## 2022-08-31 NOTE — Clinical Note
"Sri Caroralia Carirngton was seen and treated in our emergency department on 8/31/2022.  She may return to work on 09/05/2022.       If you have any questions or concerns, please don't hesitate to call.      Jovany Ray MD"

## 2022-09-13 ENCOUNTER — HOSPITAL ENCOUNTER (EMERGENCY)
Facility: HOSPITAL | Age: 37
Discharge: HOME | End: 2022-09-13
Attending: STUDENT IN AN ORGANIZED HEALTH CARE EDUCATION/TRAINING PROGRAM
Payer: MEDICAID

## 2022-09-13 VITALS
HEART RATE: 84 BPM | SYSTOLIC BLOOD PRESSURE: 110 MMHG | WEIGHT: 175 LBS | HEIGHT: 67 IN | TEMPERATURE: 98 F | RESPIRATION RATE: 16 BRPM | DIASTOLIC BLOOD PRESSURE: 81 MMHG | BODY MASS INDEX: 27.47 KG/M2 | OXYGEN SATURATION: 96 %

## 2022-09-13 DIAGNOSIS — R11.2 NAUSEA AND VOMITING, INTRACTABILITY OF VOMITING NOT SPECIFIED, UNSPECIFIED VOMITING TYPE: Primary | ICD-10-CM

## 2022-09-13 LAB
ALBUMIN SERPL BCP-MCNC: 3.6 G/DL (ref 3.5–5.2)
ALP SERPL-CCNC: 94 U/L (ref 55–135)
ALT SERPL W/O P-5'-P-CCNC: 27 U/L (ref 10–44)
ANION GAP SERPL CALC-SCNC: 4 MMOL/L (ref 8–16)
AST SERPL-CCNC: 20 U/L (ref 10–40)
B-HCG UR QL: NEGATIVE
BACTERIA #/AREA URNS HPF: NEGATIVE /HPF
BASOPHILS # BLD AUTO: 0.06 K/UL (ref 0–0.2)
BASOPHILS NFR BLD: 0.4 % (ref 0–1.9)
BILIRUB SERPL-MCNC: 0.6 MG/DL (ref 0.1–1)
BILIRUB UR QL STRIP: NEGATIVE
BUN SERPL-MCNC: 19 MG/DL (ref 6–20)
CALCIUM SERPL-MCNC: 9.1 MG/DL (ref 8.7–10.5)
CHLORIDE SERPL-SCNC: 108 MMOL/L (ref 95–110)
CLARITY UR: ABNORMAL
CO2 SERPL-SCNC: 26 MMOL/L (ref 23–29)
COLOR UR: YELLOW
CREAT SERPL-MCNC: 0.8 MG/DL (ref 0.5–1.4)
DIFFERENTIAL METHOD: ABNORMAL
EOSINOPHIL # BLD AUTO: 0 K/UL (ref 0–0.5)
EOSINOPHIL NFR BLD: 0 % (ref 0–8)
ERYTHROCYTE [DISTWIDTH] IN BLOOD BY AUTOMATED COUNT: 12.5 % (ref 11.5–14.5)
EST. GFR  (NO RACE VARIABLE): >60 ML/MIN/1.73 M^2
GLUCOSE SERPL-MCNC: 120 MG/DL (ref 70–110)
GLUCOSE UR QL STRIP: NEGATIVE
HCT VFR BLD AUTO: 48.9 % (ref 37–48.5)
HGB BLD-MCNC: 16.9 G/DL (ref 12–16)
HGB UR QL STRIP: ABNORMAL
HYALINE CASTS #/AREA URNS LPF: 16.1 /LPF
IMM GRANULOCYTES # BLD AUTO: 0.14 K/UL (ref 0–0.04)
IMM GRANULOCYTES NFR BLD AUTO: 0.9 % (ref 0–0.5)
KETONES UR QL STRIP: ABNORMAL
LEUKOCYTE ESTERASE UR QL STRIP: ABNORMAL
LIPASE SERPL-CCNC: 184 U/L (ref 23–300)
LYMPHOCYTES # BLD AUTO: 1.8 K/UL (ref 1–4.8)
LYMPHOCYTES NFR BLD: 11.3 % (ref 18–48)
MAGNESIUM SERPL-MCNC: 2.1 MG/DL (ref 1.6–2.6)
MCH RBC QN AUTO: 29.5 PG (ref 27–31)
MCHC RBC AUTO-ENTMCNC: 34.6 G/DL (ref 32–36)
MCV RBC AUTO: 85 FL (ref 82–98)
MICROSCOPIC COMMENT: ABNORMAL
MONOCYTES # BLD AUTO: 0.4 K/UL (ref 0.3–1)
MONOCYTES NFR BLD: 2.6 % (ref 4–15)
NEUTROPHILS # BLD AUTO: 13.2 K/UL (ref 1.8–7.7)
NEUTROPHILS NFR BLD: 84.8 % (ref 38–73)
NITRITE UR QL STRIP: NEGATIVE
NRBC BLD-RTO: 0 /100 WBC
PH UR STRIP: 6 [PH] (ref 5–8)
PLATELET # BLD AUTO: 436 K/UL (ref 150–450)
PMV BLD AUTO: 9 FL (ref 9.2–12.9)
POTASSIUM SERPL-SCNC: 4.1 MMOL/L (ref 3.5–5.1)
PROT SERPL-MCNC: 7.9 G/DL (ref 6–8.4)
PROT UR QL STRIP: ABNORMAL
RBC # BLD AUTO: 5.73 M/UL (ref 4–5.4)
RBC #/AREA URNS HPF: 1 /HPF (ref 0–4)
SODIUM SERPL-SCNC: 138 MMOL/L (ref 136–145)
SP GR UR STRIP: >=1.03 (ref 1–1.03)
SQUAMOUS #/AREA URNS HPF: 4 /HPF
URN SPEC COLLECT METH UR: ABNORMAL
UROBILINOGEN UR STRIP-ACNC: 1 EU/DL
WBC # BLD AUTO: 15.55 K/UL (ref 3.9–12.7)
WBC #/AREA URNS HPF: 25 /HPF (ref 0–5)

## 2022-09-13 PROCEDURE — 96375 TX/PRO/DX INJ NEW DRUG ADDON: CPT

## 2022-09-13 PROCEDURE — 96372 THER/PROPH/DIAG INJ SC/IM: CPT | Mod: 59 | Performed by: STUDENT IN AN ORGANIZED HEALTH CARE EDUCATION/TRAINING PROGRAM

## 2022-09-13 PROCEDURE — 81025 URINE PREGNANCY TEST: CPT | Performed by: STUDENT IN AN ORGANIZED HEALTH CARE EDUCATION/TRAINING PROGRAM

## 2022-09-13 PROCEDURE — 96361 HYDRATE IV INFUSION ADD-ON: CPT

## 2022-09-13 PROCEDURE — 83735 ASSAY OF MAGNESIUM: CPT | Performed by: STUDENT IN AN ORGANIZED HEALTH CARE EDUCATION/TRAINING PROGRAM

## 2022-09-13 PROCEDURE — 96374 THER/PROPH/DIAG INJ IV PUSH: CPT

## 2022-09-13 PROCEDURE — 63600175 PHARM REV CODE 636 W HCPCS: Performed by: STUDENT IN AN ORGANIZED HEALTH CARE EDUCATION/TRAINING PROGRAM

## 2022-09-13 PROCEDURE — 81000 URINALYSIS NONAUTO W/SCOPE: CPT | Performed by: STUDENT IN AN ORGANIZED HEALTH CARE EDUCATION/TRAINING PROGRAM

## 2022-09-13 PROCEDURE — 99284 EMERGENCY DEPT VISIT MOD MDM: CPT | Mod: 25

## 2022-09-13 PROCEDURE — 83690 ASSAY OF LIPASE: CPT | Performed by: STUDENT IN AN ORGANIZED HEALTH CARE EDUCATION/TRAINING PROGRAM

## 2022-09-13 PROCEDURE — 80053 COMPREHEN METABOLIC PANEL: CPT | Performed by: STUDENT IN AN ORGANIZED HEALTH CARE EDUCATION/TRAINING PROGRAM

## 2022-09-13 PROCEDURE — 87086 URINE CULTURE/COLONY COUNT: CPT | Performed by: STUDENT IN AN ORGANIZED HEALTH CARE EDUCATION/TRAINING PROGRAM

## 2022-09-13 PROCEDURE — 85025 COMPLETE CBC W/AUTO DIFF WBC: CPT | Performed by: STUDENT IN AN ORGANIZED HEALTH CARE EDUCATION/TRAINING PROGRAM

## 2022-09-13 RX ORDER — DICYCLOMINE HYDROCHLORIDE 20 MG/1
20 TABLET ORAL 2 TIMES DAILY
Qty: 20 TABLET | Refills: 0 | Status: SHIPPED | OUTPATIENT
Start: 2022-09-13 | End: 2022-10-13

## 2022-09-13 RX ORDER — ONDANSETRON 4 MG/1
4 TABLET, FILM COATED ORAL EVERY 6 HOURS
Qty: 12 TABLET | Refills: 0 | Status: SHIPPED | OUTPATIENT
Start: 2022-09-13

## 2022-09-13 RX ORDER — DICYCLOMINE HYDROCHLORIDE 10 MG/ML
20 INJECTION INTRAMUSCULAR
Status: COMPLETED | OUTPATIENT
Start: 2022-09-13 | End: 2022-09-13

## 2022-09-13 RX ORDER — KETOROLAC TROMETHAMINE 30 MG/ML
15 INJECTION, SOLUTION INTRAMUSCULAR; INTRAVENOUS
Status: COMPLETED | OUTPATIENT
Start: 2022-09-13 | End: 2022-09-13

## 2022-09-13 RX ORDER — ONDANSETRON 2 MG/ML
8 INJECTION INTRAMUSCULAR; INTRAVENOUS
Status: COMPLETED | OUTPATIENT
Start: 2022-09-13 | End: 2022-09-13

## 2022-09-13 RX ADMIN — DICYCLOMINE HYDROCHLORIDE 20 MG: 20 INJECTION, SOLUTION INTRAMUSCULAR at 10:09

## 2022-09-13 RX ADMIN — SODIUM CHLORIDE, SODIUM LACTATE, POTASSIUM CHLORIDE, AND CALCIUM CHLORIDE 1000 ML: .6; .31; .03; .02 INJECTION, SOLUTION INTRAVENOUS at 09:09

## 2022-09-13 RX ADMIN — SODIUM CHLORIDE, SODIUM LACTATE, POTASSIUM CHLORIDE, AND CALCIUM CHLORIDE 1000 ML: .6; .31; .03; .02 INJECTION, SOLUTION INTRAVENOUS at 10:09

## 2022-09-13 RX ADMIN — ONDANSETRON HYDROCHLORIDE 8 MG: 2 SOLUTION INTRAMUSCULAR; INTRAVENOUS at 09:09

## 2022-09-13 RX ADMIN — KETOROLAC TROMETHAMINE 15 MG: 30 INJECTION, SOLUTION INTRAMUSCULAR; INTRAVENOUS at 09:09

## 2022-09-13 NOTE — Clinical Note
"Sri Caroralia Carrington was seen and treated in our emergency department on 9/13/2022.  She may return to work on 09/16/2022.       If you have any questions or concerns, please don't hesitate to call.      Nirav Amado MD"

## 2022-09-13 NOTE — ED NOTES
"Pt reports relief of pain, pain is at a 4/10 on the numeric pain scale, states she "can finally rest".  "

## 2022-09-13 NOTE — ED PROVIDER NOTES
Encounter Date: 9/13/2022       History     Chief Complaint   Patient presents with    Abdominal Pain     Pt states she has been up all night with generalized stomach pains, vomiting x10, diarrhea. Pt states she took pepcid and protonix which did not help.     36-year-old female with history of IBS and hypertension presents with diffuse abdominal cramping in nonbloody nonbilious vomiting and nonbloody diarrhea that started last night patient has tried her anti acid without relief.  Patient says that she went to bed feeling well.  Denies any fever, sick contact, dysuria, abdominal pain, travels, antibiotic use.  Patient says that her stomach is sore throat from vomiting    Review of patient's allergies indicates:   Allergen Reactions    Pcn [penicillins] Hives     Past Medical History:   Diagnosis Date    Chronic constipation     Crohn's colitis     GERD (gastroesophageal reflux disease)     Hypertension     IBS (irritable bowel syndrome)     Thyroid disease      Past Surgical History:   Procedure Laterality Date    BTL      COLONOSCOPY N/A 12/22/2020    Procedure: COLONOSCOPY;  Surgeon: Lottie Villavicencio MD;  Location: [facility];  Service: General;  Laterality: N/A;     No family history on file.  Social History     Tobacco Use    Smoking status: Every Day     Packs/day: 1.00     Types: Cigarettes    Smokeless tobacco: Never   Substance Use Topics    Alcohol use: Never    Drug use: Not Currently     Comment: Hx opiates-quit 2015     Review of Systems   Constitutional: Negative.    HENT: Negative.     Respiratory: Negative.     Cardiovascular: Negative.    Gastrointestinal:  Positive for abdominal pain, diarrhea, nausea and vomiting.   Genitourinary: Negative.    Musculoskeletal: Negative.    Skin: Negative.    Neurological: Negative.    Psychiatric/Behavioral: Negative.     All other systems reviewed and are negative.    Physical Exam     Initial Vitals [09/13/22 0825]   BP Pulse Resp Temp SpO2   (!) 197/112 (!)  134 20 98.2 °F (36.8 °C) 97 %      MAP       --         Physical Exam    Nursing note and vitals reviewed.  Constitutional: Vital signs are normal. She appears well-developed and well-nourished.   HENT:   Head: Normocephalic and atraumatic.   Eyes: Conjunctivae and lids are normal.   Neck: Trachea normal. Neck supple.   Cardiovascular:  Regular rhythm, normal heart sounds, intact distal pulses and normal pulses.           No murmur heard.  Pulmonary/Chest: Breath sounds normal. No respiratory distress.   Abdominal: Abdomen is soft. Bowel sounds are normal. She exhibits no distension. There is abdominal tenderness. There is no rebound and no guarding.   Musculoskeletal:         General: Normal range of motion.      Cervical back: Neck supple.     Neurological: She is alert and oriented to person, place, and time. She has normal strength. No cranial nerve deficit or sensory deficit.   Skin: Skin is warm. Capillary refill takes less than 2 seconds.   Psychiatric: She has a normal mood and affect. Her speech is normal. Thought content normal.       ED Course   Procedures  Labs Reviewed   CBC W/ AUTO DIFFERENTIAL - Abnormal; Notable for the following components:       Result Value    WBC 15.55 (*)     RBC 5.73 (*)     Hemoglobin 16.9 (*)     Hematocrit 48.9 (*)     MPV 9.0 (*)     Immature Granulocytes 0.9 (*)     Gran # (ANC) 13.2 (*)     Immature Grans (Abs) 0.14 (*)     Gran % 84.8 (*)     Lymph % 11.3 (*)     Mono % 2.6 (*)     All other components within normal limits   COMPREHENSIVE METABOLIC PANEL - Abnormal; Notable for the following components:    Glucose 120 (*)     Anion Gap 4 (*)     All other components within normal limits   URINALYSIS, REFLEX TO URINE CULTURE - Abnormal; Notable for the following components:    Appearance, UA Cloudy (*)     Specific Gravity, UA >=1.030 (*)     Protein, UA 1+ (*)     Ketones, UA 1+ (*)     Occult Blood UA 2+ (*)     Leukocytes, UA 1+ (*)     All other components within  normal limits    Narrative:     Preferred Collection Type->Urine, Clean Catch  Specimen Source->Urine   URINALYSIS MICROSCOPIC - Abnormal; Notable for the following components:    WBC, UA 25 (*)     Hyaline Casts, UA 16.1 (*)     All other components within normal limits    Narrative:     Preferred Collection Type->Urine, Clean Catch  Specimen Source->Urine   CULTURE, URINE   LIPASE   PREGNANCY TEST, URINE RAPID    Narrative:     Specimen Source->Urine   MAGNESIUM          Imaging Results    None          Medications   lactated ringers bolus 1,000 mL (0 mLs Intravenous Stopped 9/13/22 1002)   ketorolac injection 15 mg (15 mg Intravenous Given 9/13/22 0910)   ondansetron injection 8 mg (8 mg Intravenous Given 9/13/22 0910)   dicyclomine injection 20 mg (20 mg Intramuscular Given 9/13/22 1003)   lactated ringers bolus 1,000 mL (1,000 mLs Intravenous New Bag 9/13/22 1002)     Medical Decision Making:   Initial Assessment:   36-year-old female with history of IBS and hypertension presents with diffuse abdominal cramping in nonbloody nonbilious vomiting and nonbloody diarrhea that started last night patient has tried her anti acid without relief.  Tachycardic and hypertensive.  Patient has not taken her blood pressure medication.  Afebrile.  Abdomen soft none peritonitic.  Will get labs and imaging to rule out electrolyte derangement, pancreatitis, hepatitis. , UTI.  Hydration.  Symptomatic treatment.  Re-evaluate  Clinical Tests:   Lab Tests: Ordered and Reviewed  The following lab test(s) were unremarkable: CBC, CMP, Lipase, Urinalysis and UPT           ED Course as of 09/13/22 1126   Tue Sep 13, 2022   1125 Patient tolerating p.o..  Will discharge patient home with symptomatic treatment..  Abdomen remains soft non peritonitic [HD]      ED Course User Index  [HD] Nirav Amado MD                 Clinical Impression:   Final diagnoses:  [R11.2] Nausea and vomiting, intractability of vomiting not specified, unspecified  vomiting type (Primary)      ED Disposition Condition    Discharge Stable          ED Prescriptions       Medication Sig Dispense Start Date End Date Auth. Provider    dicyclomine (BENTYL) 20 mg tablet Take 1 tablet (20 mg total) by mouth 2 (two) times daily. 20 tablet 9/13/2022 10/13/2022 Nirav Amado MD    ondansetron (ZOFRAN) 4 MG tablet Take 1 tablet (4 mg total) by mouth every 6 (six) hours. 12 tablet 9/13/2022 -- Nirav Amado MD          Follow-up Information       Follow up With Specialties Details Why Contact Info    [facility], Internal Medicine, Gynecology, Dental General Practice In 2 days  [address] [place] [id number]  [phone]               Nirav Amado MD  09/13/22 1125

## 2022-09-14 LAB
BACTERIA UR CULT: NORMAL
BACTERIA UR CULT: NORMAL

## 2023-01-19 ENCOUNTER — LAB VISIT (OUTPATIENT)
Dept: LAB | Facility: HOSPITAL | Age: 38
End: 2023-01-19
Payer: MEDICAID

## 2023-01-19 DIAGNOSIS — Z13.220 SCREENING FOR LIPID DISORDERS: ICD-10-CM

## 2023-01-19 DIAGNOSIS — E06.3 HASHIMOTO'S THYROIDITIS: ICD-10-CM

## 2023-01-19 DIAGNOSIS — I10 PRIMARY HYPERTENSION: ICD-10-CM

## 2023-01-19 LAB
ALBUMIN SERPL BCP-MCNC: 3.5 G/DL (ref 3.5–5.2)
ALP SERPL-CCNC: 99 U/L (ref 55–135)
ALT SERPL W/O P-5'-P-CCNC: 30 U/L (ref 10–44)
ANION GAP SERPL CALC-SCNC: 6 MMOL/L (ref 8–16)
AST SERPL-CCNC: 32 U/L (ref 10–40)
BASOPHILS # BLD AUTO: 0.04 K/UL (ref 0–0.2)
BASOPHILS NFR BLD: 0.3 % (ref 0–1.9)
BILIRUB SERPL-MCNC: 0.8 MG/DL (ref 0.1–1)
BUN SERPL-MCNC: 16 MG/DL (ref 6–20)
CALCIUM SERPL-MCNC: 8.8 MG/DL (ref 8.7–10.5)
CHLORIDE SERPL-SCNC: 107 MMOL/L (ref 95–110)
CHOLEST SERPL-MCNC: 279 MG/DL (ref 120–199)
CHOLEST/HDLC SERPL: 5.7 {RATIO} (ref 2–5)
CO2 SERPL-SCNC: 26 MMOL/L (ref 23–29)
CREAT SERPL-MCNC: 0.8 MG/DL (ref 0.5–1.4)
DIFFERENTIAL METHOD: ABNORMAL
EOSINOPHIL # BLD AUTO: 0 K/UL (ref 0–0.5)
EOSINOPHIL NFR BLD: 0.3 % (ref 0–8)
ERYTHROCYTE [DISTWIDTH] IN BLOOD BY AUTOMATED COUNT: 12.3 % (ref 11.5–14.5)
EST. GFR  (NO RACE VARIABLE): >60 ML/MIN/1.73 M^2
GLUCOSE SERPL-MCNC: 86 MG/DL (ref 70–110)
HCT VFR BLD AUTO: 40.7 % (ref 37–48.5)
HDLC SERPL-MCNC: 49 MG/DL (ref 40–75)
HDLC SERPL: 17.6 % (ref 20–50)
HGB BLD-MCNC: 13.7 G/DL (ref 12–16)
IMM GRANULOCYTES # BLD AUTO: 0.05 K/UL (ref 0–0.04)
IMM GRANULOCYTES NFR BLD AUTO: 0.4 % (ref 0–0.5)
LDLC SERPL CALC-MCNC: 189.8 MG/DL (ref 63–159)
LYMPHOCYTES # BLD AUTO: 3.1 K/UL (ref 1–4.8)
LYMPHOCYTES NFR BLD: 25.7 % (ref 18–48)
MCH RBC QN AUTO: 29.7 PG (ref 27–31)
MCHC RBC AUTO-ENTMCNC: 33.7 G/DL (ref 32–36)
MCV RBC AUTO: 88 FL (ref 82–98)
MONOCYTES # BLD AUTO: 0.8 K/UL (ref 0.3–1)
MONOCYTES NFR BLD: 6.4 % (ref 4–15)
NEUTROPHILS # BLD AUTO: 8 K/UL (ref 1.8–7.7)
NEUTROPHILS NFR BLD: 66.9 % (ref 38–73)
NONHDLC SERPL-MCNC: 230 MG/DL
NRBC BLD-RTO: 0 /100 WBC
PLATELET # BLD AUTO: 381 K/UL (ref 150–450)
PMV BLD AUTO: 9.5 FL (ref 9.2–12.9)
POTASSIUM SERPL-SCNC: 3.5 MMOL/L (ref 3.5–5.1)
PROT SERPL-MCNC: 7.7 G/DL (ref 6–8.4)
RBC # BLD AUTO: 4.61 M/UL (ref 4–5.4)
SODIUM SERPL-SCNC: 139 MMOL/L (ref 136–145)
T4 FREE SERPL-MCNC: 1.28 NG/DL (ref 0.71–1.51)
TRIGL SERPL-MCNC: 201 MG/DL (ref 30–150)
TSH SERPL DL<=0.005 MIU/L-ACNC: 6.51 UIU/ML (ref 0.4–4)
WBC # BLD AUTO: 11.95 K/UL (ref 3.9–12.7)

## 2023-01-19 PROCEDURE — 84443 ASSAY THYROID STIM HORMONE: CPT

## 2023-01-19 PROCEDURE — 36415 COLL VENOUS BLD VENIPUNCTURE: CPT

## 2023-01-19 PROCEDURE — 85025 COMPLETE CBC W/AUTO DIFF WBC: CPT

## 2023-01-19 PROCEDURE — 80061 LIPID PANEL: CPT

## 2023-01-19 PROCEDURE — 84439 ASSAY OF FREE THYROXINE: CPT

## 2023-01-19 PROCEDURE — 80053 COMPREHEN METABOLIC PANEL: CPT

## 2025-02-17 DIAGNOSIS — E04.9 THYROID ENLARGED: Primary | ICD-10-CM

## 2025-02-20 ENCOUNTER — HOSPITAL ENCOUNTER (OUTPATIENT)
Dept: RADIOLOGY | Facility: HOSPITAL | Age: 40
Discharge: HOME | End: 2025-02-20
Payer: MEDICAID

## 2025-02-20 DIAGNOSIS — E04.9 THYROID ENLARGED: ICD-10-CM

## 2025-02-20 PROCEDURE — 76536 US EXAM OF HEAD AND NECK: CPT | Mod: TC

## 2025-02-21 ENCOUNTER — TELEPHONE (OUTPATIENT)
Dept: ENDOCRINOLOGY | Facility: CLINIC | Age: 40
End: 2025-02-21
Payer: MEDICAID

## 2025-02-21 NOTE — TELEPHONE ENCOUNTER
Attempted to contact pt d/t referral for hypothyroidism and Enlarged Thyroid.  No answer. No voicemail.

## (undated) DEVICE — KIT VIA CUSTOM PROCEDURE

## (undated) DEVICE — SEE MEDLINE ITEM 152546

## (undated) DEVICE — Device

## (undated) DEVICE — SYR SLIP TIP 60 CC DISP

## (undated) DEVICE — TIP YANKAUERS BULB NO VENT

## (undated) DEVICE — BASIN EMESIS GRAPHITE 500ML

## (undated) DEVICE — BITE BLOCK ADULT JUMBO ENDO W/

## (undated) DEVICE — ELECTRODE FOAM 535 TEARDROP

## (undated) DEVICE — TUBING OXYGEN CONNECT BUBBLE

## (undated) DEVICE — TUBE SUC UNIVERSAL .25XIN 6FT

## (undated) DEVICE — LINER SUCTION CANNISTER REGUGA

## (undated) DEVICE — SPONGE DRY VIA GREEN

## (undated) DEVICE — UNDERPAD DISPOSABLE 30X30IN

## (undated) DEVICE — KIT BIOGUARD AIR WTR SUC VALVE

## (undated) DEVICE — SOL IRRI STRL WATER 1000ML